# Patient Record
Sex: MALE | Race: WHITE | NOT HISPANIC OR LATINO | Employment: OTHER | URBAN - NONMETROPOLITAN AREA
[De-identification: names, ages, dates, MRNs, and addresses within clinical notes are randomized per-mention and may not be internally consistent; named-entity substitution may affect disease eponyms.]

---

## 2023-11-02 DIAGNOSIS — J44.9 CHRONIC OBSTRUCTIVE PULMONARY DISEASE, UNSPECIFIED COPD TYPE (MULTI): Primary | ICD-10-CM

## 2023-11-02 RX ORDER — TIOTROPIUM BROMIDE AND OLODATEROL 3.124; 2.736 UG/1; UG/1
2 SPRAY, METERED RESPIRATORY (INHALATION) EVERY 24 HOURS
Qty: 18 G | Refills: 0 | Status: SHIPPED | OUTPATIENT
Start: 2023-11-02 | End: 2024-03-28 | Stop reason: WASHOUT

## 2023-11-02 RX ORDER — ALBUTEROL SULFATE 90 UG/1
2 AEROSOL, METERED RESPIRATORY (INHALATION) EVERY 4 HOURS PRN
Qty: 18 G | Refills: 0 | Status: SHIPPED | OUTPATIENT
Start: 2023-11-02 | End: 2024-02-28 | Stop reason: SDUPTHER

## 2023-11-02 RX ORDER — ALBUTEROL SULFATE 90 UG/1
2 AEROSOL, METERED RESPIRATORY (INHALATION) EVERY 4 HOURS PRN
COMMUNITY
Start: 2022-12-03 | End: 2023-11-02 | Stop reason: SDUPTHER

## 2023-11-02 RX ORDER — TIOTROPIUM BROMIDE AND OLODATEROL 3.124; 2.736 UG/1; UG/1
2 SPRAY, METERED RESPIRATORY (INHALATION) EVERY 24 HOURS
COMMUNITY
Start: 2022-12-15 | End: 2023-11-02 | Stop reason: SDUPTHER

## 2023-11-02 NOTE — TELEPHONE ENCOUNTER
Patient provider out on leave, currently covering. Reviewed chart note in 9/2023. No contraindications noted for refilling medication. Refill approved.    Thanks,  Genevieve Souza, CNP

## 2023-11-27 ENCOUNTER — TELEPHONE (OUTPATIENT)
Dept: PRIMARY CARE | Facility: CLINIC | Age: 78
End: 2023-11-27
Payer: MEDICARE

## 2023-11-27 DIAGNOSIS — J43.9 PULMONARY EMPHYSEMA, UNSPECIFIED EMPHYSEMA TYPE (MULTI): Primary | ICD-10-CM

## 2023-12-11 ENCOUNTER — OFFICE VISIT (OUTPATIENT)
Dept: PRIMARY CARE | Facility: CLINIC | Age: 78
End: 2023-12-11
Payer: MEDICARE

## 2023-12-11 ENCOUNTER — LAB (OUTPATIENT)
Dept: LAB | Facility: LAB | Age: 78
End: 2023-12-11
Payer: MEDICARE

## 2023-12-11 VITALS
BODY MASS INDEX: 36.71 KG/M2 | SYSTOLIC BLOOD PRESSURE: 126 MMHG | WEIGHT: 262.2 LBS | HEART RATE: 75 BPM | HEIGHT: 71 IN | OXYGEN SATURATION: 97 % | DIASTOLIC BLOOD PRESSURE: 70 MMHG | TEMPERATURE: 98 F

## 2023-12-11 DIAGNOSIS — R20.0 NUMBNESS OF FINGERS: ICD-10-CM

## 2023-12-11 DIAGNOSIS — R20.0 NUMBNESS OF FINGERS: Primary | ICD-10-CM

## 2023-12-11 PROBLEM — I10 HIGH BLOOD PRESSURE: Status: ACTIVE | Noted: 2023-12-11

## 2023-12-11 PROBLEM — G25.0 ESSENTIAL TREMOR: Status: ACTIVE | Noted: 2023-12-11

## 2023-12-11 PROBLEM — K21.9 GASTROESOPHAGEAL REFLUX DISEASE: Status: ACTIVE | Noted: 2023-12-11

## 2023-12-11 PROBLEM — H26.9 CATARACT: Status: ACTIVE | Noted: 2023-12-11

## 2023-12-11 PROBLEM — R93.89 ABNORMAL CT OF THE CHEST: Status: ACTIVE | Noted: 2023-12-11

## 2023-12-11 PROBLEM — K59.00 CONSTIPATION: Status: ACTIVE | Noted: 2023-12-11

## 2023-12-11 PROBLEM — M79.9 DISORDER OF SOFT TISSUE: Status: ACTIVE | Noted: 2023-12-11

## 2023-12-11 PROBLEM — N40.0 BENIGN PROSTATIC HYPERPLASIA WITHOUT URINARY OBSTRUCTION: Status: ACTIVE | Noted: 2023-12-11

## 2023-12-11 PROBLEM — D72.829 LEUKOCYTOSIS: Status: ACTIVE | Noted: 2023-12-11

## 2023-12-11 PROBLEM — E03.9 HYPOTHYROIDISM: Status: ACTIVE | Noted: 2023-12-11

## 2023-12-11 PROBLEM — K30 INDIGESTION: Status: ACTIVE | Noted: 2023-12-11

## 2023-12-11 PROBLEM — E78.5 HYPERLIPIDEMIA: Status: ACTIVE | Noted: 2023-12-11

## 2023-12-11 PROBLEM — E11.9 TYPE 2 DIABETES MELLITUS WITHOUT COMPLICATION (MULTI): Status: ACTIVE | Noted: 2023-12-11

## 2023-12-11 PROBLEM — I25.10 CORONARY ARTERY DISEASE: Status: ACTIVE | Noted: 2023-12-11

## 2023-12-11 PROBLEM — J18.9 COMMUNITY ACQUIRED PNEUMONIA: Status: ACTIVE | Noted: 2023-12-11

## 2023-12-11 PROBLEM — J44.9 COPD (CHRONIC OBSTRUCTIVE PULMONARY DISEASE) (MULTI): Status: ACTIVE | Noted: 2023-12-11

## 2023-12-11 LAB
ALBUMIN SERPL BCP-MCNC: 3.9 G/DL (ref 3.4–5)
ALP SERPL-CCNC: 59 U/L (ref 33–136)
ALT SERPL W P-5'-P-CCNC: 13 U/L (ref 10–52)
ANION GAP SERPL CALC-SCNC: 13 MMOL/L (ref 10–20)
AST SERPL W P-5'-P-CCNC: 15 U/L (ref 9–39)
BASOPHILS # BLD AUTO: 0.08 X10*3/UL (ref 0–0.1)
BASOPHILS NFR BLD AUTO: 0.8 %
BILIRUB SERPL-MCNC: 0.3 MG/DL (ref 0–1.2)
BUN SERPL-MCNC: 15 MG/DL (ref 6–23)
CALCIUM SERPL-MCNC: 8.6 MG/DL (ref 8.6–10.3)
CHLORIDE SERPL-SCNC: 106 MMOL/L (ref 98–107)
CO2 SERPL-SCNC: 25 MMOL/L (ref 21–32)
CREAT SERPL-MCNC: 0.74 MG/DL (ref 0.5–1.3)
EOSINOPHIL # BLD AUTO: 0.37 X10*3/UL (ref 0–0.4)
EOSINOPHIL NFR BLD AUTO: 3.5 %
ERYTHROCYTE [DISTWIDTH] IN BLOOD BY AUTOMATED COUNT: 12.9 % (ref 11.5–14.5)
GFR SERPL CREATININE-BSD FRML MDRD: >90 ML/MIN/1.73M*2
GLUCOSE SERPL-MCNC: 143 MG/DL (ref 74–99)
HCT VFR BLD AUTO: 43.2 % (ref 41–52)
HGB BLD-MCNC: 14.1 G/DL (ref 13.5–17.5)
IMM GRANULOCYTES # BLD AUTO: 0.04 X10*3/UL (ref 0–0.5)
IMM GRANULOCYTES NFR BLD AUTO: 0.4 % (ref 0–0.9)
LYMPHOCYTES # BLD AUTO: 2.87 X10*3/UL (ref 0.8–3)
LYMPHOCYTES NFR BLD AUTO: 27 %
MCH RBC QN AUTO: 30.9 PG (ref 26–34)
MCHC RBC AUTO-ENTMCNC: 32.6 G/DL (ref 32–36)
MCV RBC AUTO: 95 FL (ref 80–100)
MONOCYTES # BLD AUTO: 0.68 X10*3/UL (ref 0.05–0.8)
MONOCYTES NFR BLD AUTO: 6.4 %
NEUTROPHILS # BLD AUTO: 6.59 X10*3/UL (ref 1.6–5.5)
NEUTROPHILS NFR BLD AUTO: 61.9 %
NRBC BLD-RTO: 0 /100 WBCS (ref 0–0)
PLATELET # BLD AUTO: 344 X10*3/UL (ref 150–450)
POTASSIUM SERPL-SCNC: 4.4 MMOL/L (ref 3.5–5.3)
PROT SERPL-MCNC: 6.6 G/DL (ref 6.4–8.2)
RBC # BLD AUTO: 4.56 X10*6/UL (ref 4.5–5.9)
SODIUM SERPL-SCNC: 140 MMOL/L (ref 136–145)
TSH SERPL-ACNC: 1.64 MIU/L (ref 0.44–3.98)
WBC # BLD AUTO: 10.6 X10*3/UL (ref 4.4–11.3)

## 2023-12-11 PROCEDURE — 36415 COLL VENOUS BLD VENIPUNCTURE: CPT

## 2023-12-11 PROCEDURE — 1159F MED LIST DOCD IN RCRD: CPT | Performed by: FAMILY MEDICINE

## 2023-12-11 PROCEDURE — 99213 OFFICE O/P EST LOW 20 MIN: CPT | Performed by: FAMILY MEDICINE

## 2023-12-11 PROCEDURE — 82607 VITAMIN B-12: CPT

## 2023-12-11 PROCEDURE — 3074F SYST BP LT 130 MM HG: CPT | Performed by: FAMILY MEDICINE

## 2023-12-11 PROCEDURE — 1125F AMNT PAIN NOTED PAIN PRSNT: CPT | Performed by: FAMILY MEDICINE

## 2023-12-11 PROCEDURE — 3078F DIAST BP <80 MM HG: CPT | Performed by: FAMILY MEDICINE

## 2023-12-11 PROCEDURE — 1036F TOBACCO NON-USER: CPT | Performed by: FAMILY MEDICINE

## 2023-12-11 RX ORDER — ROSUVASTATIN CALCIUM 10 MG/1
TABLET, COATED ORAL EVERY 24 HOURS
COMMUNITY
End: 2024-02-02 | Stop reason: SDUPTHER

## 2023-12-11 RX ORDER — LISINOPRIL 10 MG/1
1 TABLET ORAL DAILY
COMMUNITY
End: 2024-02-02 | Stop reason: SDUPTHER

## 2023-12-11 RX ORDER — CLOPIDOGREL BISULFATE 75 MG/1
1 TABLET ORAL DAILY
COMMUNITY
End: 2024-02-02 | Stop reason: SDUPTHER

## 2023-12-11 RX ORDER — LEVOTHYROXINE SODIUM 75 UG/1
1 TABLET ORAL DAILY
COMMUNITY
Start: 2015-07-30 | End: 2024-02-02 | Stop reason: SDUPTHER

## 2023-12-11 RX ORDER — PHENYLEPHRINE HCL 10 MG
TABLET ORAL
COMMUNITY

## 2023-12-11 RX ORDER — TAMSULOSIN HYDROCHLORIDE 0.4 MG/1
1 CAPSULE ORAL EVERY 24 HOURS
COMMUNITY
Start: 2023-06-29 | End: 2024-03-05 | Stop reason: SDUPTHER

## 2023-12-11 ASSESSMENT — ENCOUNTER SYMPTOMS
DEPRESSION: 0
LOSS OF SENSATION IN FEET: 1
OCCASIONAL FEELINGS OF UNSTEADINESS: 1

## 2023-12-11 ASSESSMENT — PATIENT HEALTH QUESTIONNAIRE - PHQ9
1. LITTLE INTEREST OR PLEASURE IN DOING THINGS: NOT AT ALL
2. FEELING DOWN, DEPRESSED OR HOPELESS: NOT AT ALL
SUM OF ALL RESPONSES TO PHQ9 QUESTIONS 1 AND 2: 0

## 2023-12-11 ASSESSMENT — LIFESTYLE VARIABLES
HOW OFTEN DO YOU HAVE A DRINK CONTAINING ALCOHOL: NEVER
SKIP TO QUESTIONS 9-10: 1
HOW OFTEN DO YOU HAVE SIX OR MORE DRINKS ON ONE OCCASION: NEVER
AUDIT-C TOTAL SCORE: 0
HOW MANY STANDARD DRINKS CONTAINING ALCOHOL DO YOU HAVE ON A TYPICAL DAY: PATIENT DOES NOT DRINK

## 2023-12-11 ASSESSMENT — PAIN SCALES - GENERAL: PAINLEVEL: 5

## 2023-12-11 NOTE — PROGRESS NOTES
"Subjective   Patient ID: Fortino Hastings is a 78 y.o. male who presents for Numbness (And  tingling in right hand and bilat toes gradually getting worse).    HPI   He presents today with complaints of numbness in his fingers.  This most all the time his right hand and often on his left as well.  If he is playing on the iPad, he will noticed that the start to get more numb.  He does think the little finger is involved.    He also has some numbness or at least coldness in toes as well at times.  It is an intermittent problem but very frequent.  He states is been gradually getting worse over many many months if not years.    Review of Systems    Objective   /70   Pulse 75   Temp 36.7 °C (98 °F)   Ht 1.803 m (5' 11\")   Wt 119 kg (262 lb 3.2 oz)   SpO2 97%   BMI 36.57 kg/m²     Physical Exam  He is alert, parishes, his affect is pleasant.  Respirations are easy.  He has a negative Tinel's test at the wrist and at the elbows.  Fingers are warm and cap refill is good.    Assessment/Plan   Diagnoses and all orders for this visit:  Numbness of fingers  -     Comprehensive Metabolic Panel; Future  -     TSH with reflex to Free T4 if abnormal; Future  -     CBC and Auto Differential; Future  -     EMG & nerve conduction; Future  -     Vitamin B12; Future  Will start with EMG and the blood tests.  I will see him back when those are complete.  In the meantime, he will use night splint at least his right hand to see if that is helpful.       "

## 2023-12-11 NOTE — PATIENT INSTRUCTIONS
Let's get some blood tests today to try to find a cause.   Schedule the EMG test with Dr. Garza. Follow up after that. IN the meantime, wear a splint at night to keep wrist straight

## 2023-12-12 LAB — VIT B12 SERPL-MCNC: 514 PG/ML (ref 211–911)

## 2023-12-21 ENCOUNTER — ANCILLARY PROCEDURE (OUTPATIENT)
Dept: NEUROLOGY | Facility: CLINIC | Age: 78
End: 2023-12-21
Payer: MEDICARE

## 2023-12-21 DIAGNOSIS — R20.0 NUMBNESS: ICD-10-CM

## 2023-12-21 DIAGNOSIS — R20.0 NUMBNESS OF FINGERS: ICD-10-CM

## 2023-12-21 DIAGNOSIS — M79.601 PAIN OF RIGHT UPPER EXTREMITY: ICD-10-CM

## 2023-12-21 DIAGNOSIS — M79.602 PAIN OF LEFT UPPER EXTREMITY: Primary | ICD-10-CM

## 2023-12-21 PROCEDURE — 95912 NRV CNDJ TEST 11-12 STUDIES: CPT | Performed by: PSYCHIATRY & NEUROLOGY

## 2023-12-21 PROCEDURE — 95886 MUSC TEST DONE W/N TEST COMP: CPT | Performed by: PSYCHIATRY & NEUROLOGY

## 2023-12-21 NOTE — PROGRESS NOTES
Nerve conduction studies and needle EMG was performed today on this patient.  Full scanned report is available in the Media tab in Epic (Chart Review, Media tab, double-click the report to enlarge it).    Impression:  Nerve conduction study and needle examination of the bilateral upper extremities were performed, see details in table. The results were abnormal because of the followin. Absent median sensory responses on the right. Prolonged left median sensory latencies. Bilaterally prolonged median motor responses. Bilaterally low median motor amplitudes with mild denervation changes in bilateral APB muscles. The results are consistent with bilateral median neuropathy at the wrist (carpal tunnel syndrome). On the right side, moderate, and on the left side, mild to moderate.      Jacinto Garza MD

## 2023-12-26 PROBLEM — R04.2 HEMOPTYSIS: Status: ACTIVE | Noted: 2022-11-21

## 2023-12-27 ENCOUNTER — OFFICE VISIT (OUTPATIENT)
Dept: PRIMARY CARE | Facility: CLINIC | Age: 78
End: 2023-12-27
Payer: MEDICARE

## 2023-12-27 VITALS
HEART RATE: 62 BPM | WEIGHT: 261.2 LBS | BODY MASS INDEX: 36.43 KG/M2 | OXYGEN SATURATION: 97 % | SYSTOLIC BLOOD PRESSURE: 142 MMHG | DIASTOLIC BLOOD PRESSURE: 62 MMHG

## 2023-12-27 DIAGNOSIS — R20.0 NUMBNESS OF TOES: ICD-10-CM

## 2023-12-27 DIAGNOSIS — G56.03 BILATERAL CARPAL TUNNEL SYNDROME: Primary | ICD-10-CM

## 2023-12-27 PROCEDURE — 99214 OFFICE O/P EST MOD 30 MIN: CPT | Performed by: FAMILY MEDICINE

## 2023-12-27 PROCEDURE — 3077F SYST BP >= 140 MM HG: CPT | Performed by: FAMILY MEDICINE

## 2023-12-27 PROCEDURE — 3078F DIAST BP <80 MM HG: CPT | Performed by: FAMILY MEDICINE

## 2023-12-27 PROCEDURE — 1036F TOBACCO NON-USER: CPT | Performed by: FAMILY MEDICINE

## 2023-12-27 PROCEDURE — 1159F MED LIST DOCD IN RCRD: CPT | Performed by: FAMILY MEDICINE

## 2023-12-27 PROCEDURE — 1126F AMNT PAIN NOTED NONE PRSNT: CPT | Performed by: FAMILY MEDICINE

## 2023-12-27 ASSESSMENT — PAIN SCALES - GENERAL: PAINLEVEL: 0-NO PAIN

## 2023-12-27 ASSESSMENT — PATIENT HEALTH QUESTIONNAIRE - PHQ9
2. FEELING DOWN, DEPRESSED OR HOPELESS: NOT AT ALL
SUM OF ALL RESPONSES TO PHQ9 QUESTIONS 1 AND 2: 0
1. LITTLE INTEREST OR PLEASURE IN DOING THINGS: NOT AT ALL

## 2023-12-27 ASSESSMENT — ENCOUNTER SYMPTOMS
DEPRESSION: 0
LOSS OF SENSATION IN FEET: 0
OCCASIONAL FEELINGS OF UNSTEADINESS: 0

## 2023-12-27 NOTE — PATIENT INSTRUCTIONS
Visit with Dr. Green to discuss treatment for the carpal tunnel.     Discuss the toes with Dr. Castañeda. We can see about an MRI. Call [place] to schedule this.   WE can consider acupuncture as well.

## 2023-12-27 NOTE — PROGRESS NOTES
Subjective   Patient ID: Fortino Hastings is a 78 y.o. male who presents for f/u Dr. Garza.    HPI   He presents today for follow-up on his carpal tunnel syndrome and also with some numbness that has in his feet.    I reviewed his EMG test with him which showed that he does have bilateral carpal tunnel syndrome.  He did speak with Dr. Garza about this who felt that surgery might be the best option for him.  He does plan to organize sure she is getting harder hard to do because of this.  He is wearing the night splints on his wrist but that is not doing much of a good job.  Continues to be his ring finger through his thumb though occasionally will be his little finger as well.    He also has numbness in the third through fifth toes regularly.  It is worse if he has been walking around a lot or if he is tired in the evening.  Sometimes to keep up at night.  If he bangs his foot on the side of the bed at night sometimes that will help with the numbness.  States his right foot does tend to splay out more these days.  Does take a regular exercise class.  He had discussed this with Dr. Garza felt he might need an MRI.    Review of Systems    Objective   /62   Pulse 62   Wt 118 kg (261 lb 3.2 oz)   SpO2 97%   BMI 36.43 kg/m²     Physical Exam  He is alert, parishes, his affect is pleasant.  Respirations are easy.  Positive Tinel's test at the wrist of the right.  He does walk with his right foot externally rotated.  Assessment/Plan   Diagnoses and all orders for this visit:  Bilateral carpal tunnel syndrome  Numbness of toes  -     MR lumbar spine wo IV contrast; Future  Will have him see Dr. Yang to discuss his carpal tunnel as he may need surgery for that.  I have ordered an MRI for the numbness in the toes to see if there is something in the back that might be contributing to this.  I did tell him this may not be covered and he will let me know if it is not.  May consider acupuncture for this as  well, especially since he bangs the foot and help with the pain.

## 2024-01-05 ENCOUNTER — TELEPHONE (OUTPATIENT)
Dept: PRIMARY CARE | Facility: CLINIC | Age: 79
End: 2024-01-05
Payer: MEDICARE

## 2024-01-05 ENCOUNTER — APPOINTMENT (OUTPATIENT)
Dept: RADIOLOGY | Facility: HOSPITAL | Age: 79
End: 2024-01-05
Payer: MEDICARE

## 2024-01-05 NOTE — TELEPHONE ENCOUNTER
I spoke with him today because I did discuss this with Dr. Ubaldo Avendano, the peer to peer reviewer, last night.  The MRI is not covered because he has not completed 6 weeks of conservative therapy.      Bill states that he has been doing chiropractic care and he has been doing home stretching.  He has been using some over-the-counter medications to help with the pain.  I asked him to schedule appointment again in 2 weeks so we can see how he has been responding to the above.  If he is not relieving his pain with this, we can reschedule the MRI, which Dr. Avendano that should be covered as long as he is done the 6 weeks of conservative therapy.  He will just need a full neuroexam.

## 2024-01-22 ENCOUNTER — OFFICE VISIT (OUTPATIENT)
Dept: PRIMARY CARE | Facility: CLINIC | Age: 79
End: 2024-01-22
Payer: MEDICARE

## 2024-01-22 VITALS
HEART RATE: 66 BPM | DIASTOLIC BLOOD PRESSURE: 62 MMHG | WEIGHT: 262.4 LBS | SYSTOLIC BLOOD PRESSURE: 134 MMHG | OXYGEN SATURATION: 96 % | BODY MASS INDEX: 36.6 KG/M2

## 2024-01-22 DIAGNOSIS — H92.01 RIGHT EAR PAIN: ICD-10-CM

## 2024-01-22 DIAGNOSIS — R20.0 NUMBNESS OF TOES: ICD-10-CM

## 2024-01-22 DIAGNOSIS — G89.29 CHRONIC RIGHT-SIDED LOW BACK PAIN WITH RIGHT-SIDED SCIATICA: Primary | ICD-10-CM

## 2024-01-22 DIAGNOSIS — E11.9 TYPE 2 DIABETES MELLITUS WITHOUT COMPLICATION, WITHOUT LONG-TERM CURRENT USE OF INSULIN (MULTI): ICD-10-CM

## 2024-01-22 DIAGNOSIS — M54.41 CHRONIC RIGHT-SIDED LOW BACK PAIN WITH RIGHT-SIDED SCIATICA: Primary | ICD-10-CM

## 2024-01-22 DIAGNOSIS — R29.898 WEAKNESS OF RIGHT LEG: ICD-10-CM

## 2024-01-22 LAB — POC HEMOGLOBIN A1C: 6.3 % (ref 4.2–6.5)

## 2024-01-22 PROCEDURE — 1159F MED LIST DOCD IN RCRD: CPT | Performed by: FAMILY MEDICINE

## 2024-01-22 PROCEDURE — 1036F TOBACCO NON-USER: CPT | Performed by: FAMILY MEDICINE

## 2024-01-22 PROCEDURE — 3075F SYST BP GE 130 - 139MM HG: CPT | Performed by: FAMILY MEDICINE

## 2024-01-22 PROCEDURE — 3078F DIAST BP <80 MM HG: CPT | Performed by: FAMILY MEDICINE

## 2024-01-22 PROCEDURE — 1126F AMNT PAIN NOTED NONE PRSNT: CPT | Performed by: FAMILY MEDICINE

## 2024-01-22 PROCEDURE — 99214 OFFICE O/P EST MOD 30 MIN: CPT | Performed by: FAMILY MEDICINE

## 2024-01-22 ASSESSMENT — ENCOUNTER SYMPTOMS
DEPRESSION: 0
OCCASIONAL FEELINGS OF UNSTEADINESS: 0
LOSS OF SENSATION IN FEET: 0

## 2024-01-22 ASSESSMENT — PAIN SCALES - GENERAL: PAINLEVEL: 0-NO PAIN

## 2024-01-22 NOTE — PROGRESS NOTES
"Subjective   Patient ID: Fortino Hastings is a 78 y.o. male who presents for MRI Options and Earache.    He presents today for follow-up on his back pain.  Suffering for work 2 years with the back pain. Pain mostly in the right back and hip and goes down the leg.     He has tried a number of modalities for this pain.  He normally sees a chiropractor every 3 to 4 weeks but over the last 6 to 8 weeks has increased that to every other week.  He has attended physical therapy for this problem 3 times in the last 2 years.  He did not see much improvement with that.  He has been given exercises to do, which she does every day.  Over the last 6 weeks, however, he stepped that up to doing the stretches and exercises twice a day.  He has not noticed much response with that.  He is also attending the senior exercise class twice a week in order to help the pain.  Does take ibuprofen as needed which helps only somewhat.    He uses cane if he walks on level ground because he can feel unsteady.  He walks a long distance the right hip will freeze up and just will not move for him.  He will have to sit down and rest.  If he drives in the car for longer than about 40 minutes he states his feet and legs will also freeze up in a way that he states he cannot quite describe.  This reason, Michelle, his wife will have to drive home.  For this reason, he does not really have over long distances any longer.  That is, this is really impacting his lifestyle.    He wears compression hose, which can help especially with walking for long distances.  He uses a cane on unlevel ground or if he has to walk a long time. If he walks a long distance, the right hip \"freezes\" and won't move.    He has numbness in his lateral toes bilaterally but more on the right than the left.    Also checked his hemoglobin A1c today which is well-controlled.    Earache   There is pain in the right ear. This is a new problem. The current episode started 1 to 4 weeks ago. " Episode frequency: occassionally. The problem has been unchanged. There has been no fever. The pain is at a severity of 5/10. The pain is moderate.        Review of Systems   HENT:  Positive for ear pain (Pain in the right ear, intermittently.  Can be severe, like someone's dabbing it with a knitting needle.  Lasts only a brief time but if longer, Tylenol will relieve it).        Objective   /62   Pulse 66   Wt 119 kg (262 lb 6.4 oz)   SpO2 96%   BMI 36.60 kg/m²     Physical Exam  Constitutional:       Appearance: Normal appearance. He is not ill-appearing.   HENT:      Right Ear: Tympanic membrane and ear canal normal. No middle ear effusion. There is no impacted cerumen. No foreign body. Tympanic membrane is not perforated, erythematous, retracted or bulging.      Nose: No mucosal edema, congestion or rhinorrhea.   Pulmonary:      Effort: Pulmonary effort is normal.   Musculoskeletal:      Cervical back: Tenderness: ..      Lumbar back: Spasms and tenderness (Over the lumbar sacral joint and in the paraspinal muscles of the lower back as well as palpating the lateral posterior lateral hip) present. No swelling or edema. Positive right straight leg raise test (Mildly so).        Back:    Neurological:      Mental Status: He is alert.      Gait: Gait abnormal (Walks somewhat slowly and the feet are held tomorrow a 45 degree outward splay.).      Deep Tendon Reflexes:      Reflex Scores:       Patellar reflexes are 2+ on the right side and 2+ on the left side.       Achilles reflexes are 2+ on the right side and 2+ on the left side.     Comments: Strength is grossly 5 out of-although large muscle groups of the lower extremity.  He does have a subjective sense of numbness over the lateral 2-3 toes on each side on the upper surface especially.         Assessment/Plan   Diagnoses and all orders for this visit:  Chronic right-sided low back pain with right-sided sciatica  -     MR lumbar spine wo IV contrast;  Future  Numbness of toes  -     MR lumbar spine wo IV contrast; Future  Weakness of right leg  -     MR lumbar spine wo IV contrast; Future  Type 2 diabetes mellitus without complication, without long-term current use of insulin (CMS/[facility])  -     POCT glycosylated hemoglobin (Hb A1C) manually resulted  He has completed 3 rounds of physical therapy and over the last 6 weeks has increased his stretching, increase his chiropractic visits and been doing the senior exercise all in an effort to improve this but he has not seen much relief.  Ibuprofen has been much relief.  I do recommend that we do the since the 6-week course of treatment has not helped.    I want him to try to pop his ears frequently in the day and see if that helps open up the right ear pain which is likely related to symptoms eustachian tube dysfunction.    Diabetes is well-controlled.

## 2024-01-22 NOTE — PATIENT INSTRUCTIONS
Go ahead and schedule the MRI again.     Pop ears every and let me know if that does not resolve the issue

## 2024-02-02 DIAGNOSIS — I10 PRIMARY HYPERTENSION: ICD-10-CM

## 2024-02-02 DIAGNOSIS — I25.10 CORONARY ARTERY DISEASE, UNSPECIFIED VESSEL OR LESION TYPE, UNSPECIFIED WHETHER ANGINA PRESENT, UNSPECIFIED WHETHER NATIVE OR TRANSPLANTED HEART: Primary | ICD-10-CM

## 2024-02-02 DIAGNOSIS — E03.9 ACQUIRED HYPOTHYROIDISM: ICD-10-CM

## 2024-02-02 RX ORDER — ROSUVASTATIN CALCIUM 10 MG/1
10 TABLET, COATED ORAL EVERY 24 HOURS
Qty: 90 TABLET | Refills: 1 | Status: SHIPPED | OUTPATIENT
Start: 2024-02-02

## 2024-02-02 RX ORDER — METOPROLOL TARTRATE 25 MG/1
25 TABLET, FILM COATED ORAL 2 TIMES DAILY
Qty: 180 TABLET | Refills: 1 | Status: SHIPPED | OUTPATIENT
Start: 2024-02-02

## 2024-02-02 RX ORDER — LISINOPRIL 10 MG/1
10 TABLET ORAL DAILY
Qty: 90 TABLET | Refills: 1 | Status: SHIPPED | OUTPATIENT
Start: 2024-02-02

## 2024-02-02 RX ORDER — LEVOTHYROXINE SODIUM 75 UG/1
75 TABLET ORAL DAILY
Qty: 90 TABLET | Refills: 1 | Status: SHIPPED | OUTPATIENT
Start: 2024-02-02

## 2024-02-02 RX ORDER — CLOPIDOGREL BISULFATE 75 MG/1
75 TABLET ORAL DAILY
Qty: 90 TABLET | Refills: 1 | Status: SHIPPED | OUTPATIENT
Start: 2024-02-02

## 2024-02-05 ENCOUNTER — TELEPHONE (OUTPATIENT)
Dept: PULMONOLOGY | Facility: CLINIC | Age: 79
End: 2024-02-05
Payer: MEDICARE

## 2024-02-05 NOTE — TELEPHONE ENCOUNTER
Pt called just got notice that his stiolto will no longer be cover, asking if it can be change,   He believes the anoro ellipta is covered by his insurance  Would like it sent to mail order   [facility] mail order

## 2024-02-07 DIAGNOSIS — J43.2 CENTRILOBULAR EMPHYSEMA (MULTI): Primary | ICD-10-CM

## 2024-02-19 ENCOUNTER — TELEPHONE (OUTPATIENT)
Dept: PRIMARY CARE | Facility: CLINIC | Age: 79
End: 2024-02-19
Payer: MEDICARE

## 2024-02-19 NOTE — TELEPHONE ENCOUNTER
MRI is still not approved.  Patient is frustrated and is asking what needs to be done.  Pt states he has had chiropractic care continuously up until 2/6 when he stopped due to carpal tunnel surgery.  He is also asking if you think acupuncture would help.

## 2024-02-21 ENCOUNTER — APPOINTMENT (OUTPATIENT)
Dept: RADIOLOGY | Facility: HOSPITAL | Age: 79
End: 2024-02-21
Payer: MEDICARE

## 2024-02-28 DIAGNOSIS — J44.9 CHRONIC OBSTRUCTIVE PULMONARY DISEASE, UNSPECIFIED COPD TYPE (MULTI): ICD-10-CM

## 2024-02-28 RX ORDER — ALBUTEROL SULFATE 90 UG/1
2 AEROSOL, METERED RESPIRATORY (INHALATION) EVERY 4 HOURS PRN
Qty: 18 G | Refills: 0 | Status: SHIPPED | OUTPATIENT
Start: 2024-02-28 | End: 2024-03-28 | Stop reason: SDUPTHER

## 2024-03-05 DIAGNOSIS — N40.0 BENIGN PROSTATIC HYPERPLASIA WITHOUT URINARY OBSTRUCTION: Primary | ICD-10-CM

## 2024-03-05 RX ORDER — TAMSULOSIN HYDROCHLORIDE 0.4 MG/1
0.4 CAPSULE ORAL EVERY 24 HOURS
Qty: 90 CAPSULE | Refills: 1 | Status: SHIPPED | OUTPATIENT
Start: 2024-03-05

## 2024-03-28 ENCOUNTER — OFFICE VISIT (OUTPATIENT)
Dept: PULMONOLOGY | Facility: CLINIC | Age: 79
End: 2024-03-28
Payer: MEDICARE

## 2024-03-28 ENCOUNTER — TELEPHONE (OUTPATIENT)
Dept: PULMONOLOGY | Facility: CLINIC | Age: 79
End: 2024-03-28

## 2024-03-28 VITALS
OXYGEN SATURATION: 95 % | SYSTOLIC BLOOD PRESSURE: 122 MMHG | BODY MASS INDEX: 36.18 KG/M2 | DIASTOLIC BLOOD PRESSURE: 72 MMHG | WEIGHT: 259.4 LBS | HEART RATE: 63 BPM

## 2024-03-28 DIAGNOSIS — R93.89 ABNORMAL CT OF THE CHEST: ICD-10-CM

## 2024-03-28 DIAGNOSIS — J43.2 CENTRILOBULAR EMPHYSEMA (MULTI): Primary | ICD-10-CM

## 2024-03-28 DIAGNOSIS — J44.9 CHRONIC OBSTRUCTIVE PULMONARY DISEASE, UNSPECIFIED COPD TYPE (MULTI): ICD-10-CM

## 2024-03-28 PROCEDURE — 3074F SYST BP LT 130 MM HG: CPT | Performed by: NURSE PRACTITIONER

## 2024-03-28 PROCEDURE — 99214 OFFICE O/P EST MOD 30 MIN: CPT | Performed by: NURSE PRACTITIONER

## 2024-03-28 PROCEDURE — 1159F MED LIST DOCD IN RCRD: CPT | Performed by: NURSE PRACTITIONER

## 2024-03-28 PROCEDURE — 1036F TOBACCO NON-USER: CPT | Performed by: NURSE PRACTITIONER

## 2024-03-28 PROCEDURE — 3078F DIAST BP <80 MM HG: CPT | Performed by: NURSE PRACTITIONER

## 2024-03-28 PROCEDURE — 1160F RVW MEDS BY RX/DR IN RCRD: CPT | Performed by: NURSE PRACTITIONER

## 2024-03-28 RX ORDER — ALBUTEROL SULFATE 90 UG/1
2 AEROSOL, METERED RESPIRATORY (INHALATION) EVERY 4 HOURS PRN
Qty: 24 G | Refills: 3 | Status: SHIPPED | OUTPATIENT
Start: 2024-03-28

## 2024-03-28 NOTE — TELEPHONE ENCOUNTER
Pt lmm and said that he would like the respvi?? Sent to [facility] he said it is cheaper to get med there    Thank you!

## 2024-03-28 NOTE — PROGRESS NOTES
Subjective   Patient ID: Fortino Hastings is a 78 y.o. male who presents for No chief complaint on file..  HPI  HPI: New patient here today for follow-up after hospitalization. He was hospitalized with pneumonia. On a chest CT he had quite a bit of middle lobe collapse. He also has emphysema. Patient does have albuterol at home he does get short of breath. I would like him to have a follow-up CT in 3 months at that time if the middle lobe area is still present then he may need a PET scan and EBUS.    03/16/23 he is here today for follow-up. He had a new chest CT which shows considerable improvement in the right middle lobe collapse. It is almost completely resolved but I would like to do another CT in 6 months. He had a PFT which shows an FEV1 of 74%. He was started on Stiolto which he really does like and he uses his albuterol in the evenings before bed which controls his wheezing. He had a lot of questions today with his wife I was able to answer these. 10 minutes spent preparing patient's chart. 30 minutes spent with patient answering questions and determining care. 10 minutes spent charting and ordering tests and medications    09/21/23 he is here today for follow-up. He had a new chest CT which does show improvement of the right middle lobe. He does have some very small nodules 4 and 5 mm. He is doing well with the Stiolto he uses his albuterol once a day. He tries to remain active. 5 minutes spent preparing patient's chart. 20 minutes spent with patient answering questions and determining care. 5 minutes spent charting and ordering tests and medications     03/28/24 he is here today for follow-up.  Unfortunately insurance would no longer cover his Stiolto so we had to change him to Anoro.  It is expensive for him.  It looks like insurance will cover Bevespi also I encouraged him to call insurance and see what his cost would be for the Bevespi.  Overall he has been doing well.  He just had carpal tunnel surgery  and is healing nicely.         Previous pulmonary history:   He has no history of recurrent infections, or lung disease as a child. He had no previous lung hx, never on oxygen or inhaler therapy. He currently is on no supplemental oxygen.      Inhalers/nebulized medications: albuterol      Hospitalization History:  He has been hospitalized over the last year for breathing related problem.     Sleep history:  Denies snoring, apneas. Admits to feeling tired during the day.   Does take frequent naps.  STOP-BANG score of 4     Comorbidities:   HTN  Hypothyroidism    SH:  smoking: former   drinking: none  illicit drug use: none     Occupation: (Full questionnaire on exposures obtained, discussed with the patient and scanned to EMR)  worked as retired teacher   No known exposure to asbestos, silica or beryllium     Family History:  No family history of lung diseases or cancer     Imaging history: (I have personally reviewed the imaging below)  12/1/22 Ct for PE consolidation in the middle lobe marked patchy and nodular infiltration in the middle lobe as well as the medial upper lobe. Several areas of large bulla. Also some consolidation in the left lung base. Prominent paratracheal nodes and enlarged hilar nodes     PFTs: PENDING   // -> FEV1/FVC ratio/FEV1 (no BD response)/FVC/DLCO /TLC/RV to TLC ratio     6 MWTs: None on record     Lung biopsy: None on record     Echo: None on record  -> Normal EF, diastolic dysfunction, with normal LA, RV size and function     Labs:  : Hb 12.5 , Eos <250, Bicarb 24 , Creat 0.80       Review of Systems   All other systems reviewed and are negative.      Objective   Physical Exam  Vitals and nursing note reviewed.   Constitutional:       Appearance: Normal appearance. He is obese.   HENT:      Head: Normocephalic.      Nose: Nose normal.   Eyes:      Pupils: Pupils are equal, round, and reactive to light.   Cardiovascular:      Rate and Rhythm: Normal rate.   Pulmonary:      Effort:  Pulmonary effort is normal.      Breath sounds: Normal breath sounds.   Neurological:      General: No focal deficit present.      Mental Status: He is alert and oriented to person, place, and time. Mental status is at baseline.   Psychiatric:         Mood and Affect: Mood normal.         Behavior: Behavior normal.         Thought Content: Thought content normal.         Judgment: Judgment normal.         Assessment/Plan     # COPD with emphysema: - chest CT shows emphysema with bulla  -will screen for A1AT deficiency in clinic today (genetic screen)  -FEV1 post-bd of , GOLD stage  -At baseline with no active sign of exacerbation (increased dyspnea, cough, sputum or change in sputum characteristic)  -Advanced therapies including LVRS and Lung transplantation.  -Counseled on the role of diet and exercise  -Pulmonary rehab referral made.  -Vaccinations: Yearly influenza vaccines, Pneumoccocal (both PCV13 and PPSV23 for all patients 65 y.o or above)  -Depression score.  -Sleep evaluation as below.  -Echo to evaluate for core pulmonale.  -Does not need oxygen at rest. Oxygen need evaluation with walking and sleep (nighttime oximetry)   -He has albuterol I will start him on Stiolto or Anoro whichever his insurance will cover   - 03/16/23 he is using Stiolto and has noticed an improvement. He uses his albuterol every night before bed  09/21/23 he is doing very well with the Stiolto he uses that regularly uses his albuterol about once a day  03/28/24 insurance changed him to Anoro but it is very expensive.  Insurance will also cover Bevespi he is going to call and find out what his cost would be for Bevespi if it is less we can certainly change him    #Lung nodules   - With right middle lobe collapse  09/21/23 chest CT does show improvement of the right middle lobe also has some small nodules    #Post hospitalization for pneumonia   -Chest CT did show right middle lobe collapse along with emphysema and bullae   -We will  repeat a chest CT in 3 months   -Patient may need a PET scan and an EBUS if the area still persists   - 03/16/23 the right middle lobe does show improvement although not complete resolution. I would like a new CT in 6 months -shows a 4 mm nodule in the right lower lobe  03/28/24 his last chest CT did show considerable improvement.  We discussed doing a CT before his next visit but we are actually going to wait and talk about it at his next visit.     Mr. Hastings it was a pleasure seeing you in the office today. We discussed the following:       - Please continue your Anoro, you use this every day   - You have your albuterol to use when you feel short of breath   - Please stay active    Follow up in 6 months          JERICA Garcia-JOSELO 03/28/24 10:53 AM

## 2024-03-28 NOTE — PATIENT INSTRUCTIONS
Mr. Hastings it was a pleasure seeing you in the office today. We discussed the following:       - Please continue your Anoro, you use this every day   - You have your albuterol to use when you feel short of breath   - Please stay active    Follow up in 6 months

## 2024-04-16 ENCOUNTER — APPOINTMENT (OUTPATIENT)
Dept: PRIMARY CARE | Facility: CLINIC | Age: 79
End: 2024-04-16
Payer: MEDICARE

## 2024-04-17 ENCOUNTER — OFFICE VISIT (OUTPATIENT)
Dept: PRIMARY CARE | Facility: CLINIC | Age: 79
End: 2024-04-17
Payer: MEDICARE

## 2024-04-17 VITALS
WEIGHT: 262.2 LBS | HEART RATE: 63 BPM | BODY MASS INDEX: 36.57 KG/M2 | OXYGEN SATURATION: 96 % | DIASTOLIC BLOOD PRESSURE: 72 MMHG | SYSTOLIC BLOOD PRESSURE: 148 MMHG

## 2024-04-17 DIAGNOSIS — G89.29 CHRONIC RIGHT-SIDED LOW BACK PAIN WITH RIGHT-SIDED SCIATICA: Primary | ICD-10-CM

## 2024-04-17 DIAGNOSIS — M54.41 CHRONIC RIGHT-SIDED LOW BACK PAIN WITH RIGHT-SIDED SCIATICA: Primary | ICD-10-CM

## 2024-04-17 DIAGNOSIS — R29.898 WEAKNESS OF BOTH LEGS: ICD-10-CM

## 2024-04-17 DIAGNOSIS — R20.0 NUMBNESS OF TOES: ICD-10-CM

## 2024-04-17 PROCEDURE — 3078F DIAST BP <80 MM HG: CPT | Performed by: FAMILY MEDICINE

## 2024-04-17 PROCEDURE — 1036F TOBACCO NON-USER: CPT | Performed by: FAMILY MEDICINE

## 2024-04-17 PROCEDURE — 3077F SYST BP >= 140 MM HG: CPT | Performed by: FAMILY MEDICINE

## 2024-04-17 PROCEDURE — 99213 OFFICE O/P EST LOW 20 MIN: CPT | Performed by: FAMILY MEDICINE

## 2024-04-17 PROCEDURE — 1160F RVW MEDS BY RX/DR IN RCRD: CPT | Performed by: FAMILY MEDICINE

## 2024-04-17 PROCEDURE — 1159F MED LIST DOCD IN RCRD: CPT | Performed by: FAMILY MEDICINE

## 2024-04-17 PROCEDURE — 1125F AMNT PAIN NOTED PAIN PRSNT: CPT | Performed by: FAMILY MEDICINE

## 2024-04-17 ASSESSMENT — ENCOUNTER SYMPTOMS
DEPRESSION: 0
OCCASIONAL FEELINGS OF UNSTEADINESS: 1
LOSS OF SENSATION IN FEET: 0

## 2024-04-17 ASSESSMENT — PAIN SCALES - GENERAL: PAINLEVEL: 5

## 2024-04-17 NOTE — PATIENT INSTRUCTIONS
PAIN MANAGEMENT IN ADULTS    What is pain? Pain is your body’s way to reacting to injury or illness. Everybody reacts to pain in different ways. What you think is painful may not be painful to someone else. But, pain is whatever you say it is!  What causes pain? Many things, such as an injury, surgery, or a disease can cause pain. Some pain is caused by pressure one a nerve, such as a cancerous tumor or slipped disc. Other pain is caused when nerves are cut as in an accident or surgery. After an injury or surgery you may not want to move the painful part of our body at all. But, you may have pain because you are not moving this body part. Sometimes there is no clear reason for your pain.    What are the different types of pain?  Acute pain is short?lived and lasts less than 3 months. Caregivers help first work to remove the cause of the pain, such as fixing a broken arm. Acute pain usually can be controlled or stopped with pain medicine.  Chronic pain lasts longer than 3?6 months. This kind of pain is often more complicated. Caregivers may use medicine along with other treatments, like self?hypnosis and relaxation to help you learn to deal with the chronic pain.  What is your pain like? Caregivers want you to talk to them about your pain. This helps them learn what may be causing the pain and how best to treat it.  Why is pain control important? Pain can affect your appetite, how well you sleep, your energy and your ability to do things. Pain can also affect your mood and relationship with others. If caregivers can help you control your pain, you will suffer less and can even heal faster.  How can pain medicine be given? Following are the many different ways pain medicine can be given depending on the kind of pain you are experiencing.  By mouth: you may be given pills or liquid to swallow or you may be given a pill or liquid to put under your tongue. Some medicine can also be given as a lozenge  like a cough drop or even a special lollipop.  Rectal: medicine in a suppository is put into your rectum.  Shot/Injection: pain medicine can be given as a shot/injection into an IV, into a muscle or under the skin  Topical: medicine in a cream or gel is spread over the skin.  Transdermal: medicine given in a patch and put onto the skin. This medicine is released slowly to give pain relief for as long as 72 hours.    How can you take pain medicine safely and make it work best for you? The following are many different ways pain medicine can be given depending on the kind of pain you have.  Some pain medicines can make you breathe less deeply and less often. The medicine may also make you sleepy, dizzy, and unsafe to drive a car or use heavy equipment. For these reasons, it is very important to follow your caregiver’s advice on how to use this medicine.  Sometimes the pain is worse when you first wake up in the morning. This may happened if you did not have enough pain medicine in your blood stream to last through the night. Caregivers may tell you to take a dose of pain medicine during the night.  Some foods, alcohol, and other medicines may cause unpleasant side effects when you take pain medicine. Follow your caregiver’s advice about how to prevent these problems.  Pain medicine can make you constipated. Straining with a BM can make you pain worse. Do not try to push the BM out if it’s too hard. Contact your caregiver if you become constipated.  Other non?drug pain control methods. There are many pain control techniques that can held you deal with pain even if it does not go away completely. It is important to practice some of the techniques when you don’t have pain if possible. This will help the technique work better during an attack of pain.  Cold and heat: both cold and heat can help lessen some types of post?op pain. Caregivers will tell you if cold and/or hot packs will help your pain.  Distraction: teaches  you to focus your attention on something other than pain. Playing cards or games, talking and visiting family may relax you and keep you from thinking about pain.  Hydrotherapy: is a gentle water exercise program. It can strengthen muscles that are not injured and lessen inflammation. Talk to your physical therapist or your caregiver about starting a hydrotherapy program.  Massage  Music  Physical therapy  Rest  Surgery/Nerve blocks  Call your caregiver if you have any of the following problems:  The pain medicine you are taking makes you sleepier than usual or confused  You have new pain or the pain seems different than before  You have constipation  Care agreement: You have the right to help plan your care. To help with this plan you must learn about your pain, what is causing it, and how it can be treated. You can then discuss treatment options with your caregivers. Work with them to decide what care will be used to treat you. You always have the right to refuse treatment.

## 2024-04-17 NOTE — PROGRESS NOTES
Subjective   Patient ID: Fortino Hastings is a 78 y.o. male who presents for Neuro Exam for MRI Approval.    HPI   He presents today to discuss the low back pain.  He states it is in the right side of the back can radiate down into the posterior hip and then down into the leg as well.  He suffered with this for some time.  He has been seeing a chiropractor, Dr. Castañeda, every 3 years for the last 2 years.  He completed a 6-week course of physical therapy starting at the end of last year into the beginning of this.  That was not very effective for him.  He does an exercise class at the [facility] every Tuesday and Thursday, which includes stretching, but that has not been that effective.  He wears a brace for his back I comes around the front as well.  He uses a cane because he cannot always walk that far due to the pain.    He has numbness in the third fourth and fifth toes bilaterally.    He had an episode in [facility] recently where his lower legs just froze up on him and he really was not able to move them.  He was able to get to a chair and sit down in the okay.  He always feels unsteady on his feet and the legs can be weak.    He did have a CT scan of the lumbar region on Emily 10, 202 which showed severe multilevel degenerative disc disease most notably at L2-L3, L3-L4, L4-L5, and L5-S1.  There was suspicion of significant foraminal stenosis most notably at L4-L5 and L3-L4 on the left.  The radiologist advised an MRI to evaluate for the nerve root impingement.    Review of Systems    Objective   /72   Pulse 63   Wt 119 kg (262 lb 3.2 oz)   SpO2 96%   BMI 36.57 kg/m²     Physical Exam  Constitutional:       Appearance: Normal appearance. He is not ill-appearing.   Pulmonary:      Effort: Pulmonary effort is normal.   Neurological:      Mental Status: He is alert.      Sensory: Sensory deficit (To light touch, he does not feel the third fourth and fifth toes (the lateral toes) much to light touch.)  present.      Deep Tendon Reflexes: Babinski sign absent on the right side. Babinski sign absent on the left side.      Reflex Scores:       Patellar reflexes are 2+ on the right side and 0 on the left side.       Achilles reflexes are 0 on the right side and 2+ on the left side.     Comments: I tested muscle strength of his plantar and dorsiflexion, knee extension and flexion, hip extension and flexion.  He did have somewhat decreased knee flexion strength in the left leg.       Assessment/Plan   Diagnoses and all orders for this visit:  Chronic right-sided low back pain with right-sided sciatica  Numbness of toes  Weakness of both legs  He has not responded to physical therapy, exercise/stretching classes, or chiropractic care.  He has decreased reflexes in the patella on the left and the Achilles reflex on the right.  He has overall weakness in the lower extremities when walking.  I do recommend an MRI to assess the back to see us if there is any nerve impingement or spinal stenosis.  This is in keeping with the recommendation of the radiologist in June of last year for an MRI to rule out nerve impingement.

## 2024-04-29 ENCOUNTER — APPOINTMENT (OUTPATIENT)
Dept: RADIOLOGY | Facility: HOSPITAL | Age: 79
End: 2024-04-29
Payer: MEDICARE

## 2024-04-29 ENCOUNTER — HOSPITAL ENCOUNTER (OUTPATIENT)
Dept: RADIOLOGY | Facility: HOSPITAL | Age: 79
Discharge: HOME | End: 2024-04-29
Payer: MEDICARE

## 2024-04-29 DIAGNOSIS — M54.41 CHRONIC RIGHT-SIDED LOW BACK PAIN WITH RIGHT-SIDED SCIATICA: ICD-10-CM

## 2024-04-29 DIAGNOSIS — G89.29 CHRONIC RIGHT-SIDED LOW BACK PAIN WITH RIGHT-SIDED SCIATICA: ICD-10-CM

## 2024-04-29 DIAGNOSIS — R29.898 WEAKNESS OF RIGHT LEG: ICD-10-CM

## 2024-04-29 DIAGNOSIS — R20.0 NUMBNESS OF TOES: ICD-10-CM

## 2024-04-29 PROCEDURE — 72148 MRI LUMBAR SPINE W/O DYE: CPT | Performed by: RADIOLOGY

## 2024-04-29 PROCEDURE — 72148 MRI LUMBAR SPINE W/O DYE: CPT

## 2024-05-01 ENCOUNTER — OFFICE VISIT (OUTPATIENT)
Dept: PRIMARY CARE | Facility: CLINIC | Age: 79
End: 2024-05-01
Payer: MEDICARE

## 2024-05-01 VITALS
OXYGEN SATURATION: 94 % | WEIGHT: 259.6 LBS | BODY MASS INDEX: 36.21 KG/M2 | SYSTOLIC BLOOD PRESSURE: 132 MMHG | DIASTOLIC BLOOD PRESSURE: 68 MMHG | HEART RATE: 69 BPM

## 2024-05-01 DIAGNOSIS — G89.29 CHRONIC RIGHT-SIDED LOW BACK PAIN WITH RIGHT-SIDED SCIATICA: Primary | ICD-10-CM

## 2024-05-01 DIAGNOSIS — M54.41 CHRONIC RIGHT-SIDED LOW BACK PAIN WITH RIGHT-SIDED SCIATICA: Primary | ICD-10-CM

## 2024-05-01 PROCEDURE — 99213 OFFICE O/P EST LOW 20 MIN: CPT | Performed by: FAMILY MEDICINE

## 2024-05-01 PROCEDURE — 3078F DIAST BP <80 MM HG: CPT | Performed by: FAMILY MEDICINE

## 2024-05-01 PROCEDURE — 1160F RVW MEDS BY RX/DR IN RCRD: CPT | Performed by: FAMILY MEDICINE

## 2024-05-01 PROCEDURE — 1159F MED LIST DOCD IN RCRD: CPT | Performed by: FAMILY MEDICINE

## 2024-05-01 PROCEDURE — 1126F AMNT PAIN NOTED NONE PRSNT: CPT | Performed by: FAMILY MEDICINE

## 2024-05-01 PROCEDURE — 3075F SYST BP GE 130 - 139MM HG: CPT | Performed by: FAMILY MEDICINE

## 2024-05-01 RX ORDER — CYCLOBENZAPRINE HCL 10 MG
10 TABLET ORAL 2 TIMES DAILY PRN
COMMUNITY
End: 2024-05-17

## 2024-05-01 ASSESSMENT — ENCOUNTER SYMPTOMS
DEPRESSION: 0
LOSS OF SENSATION IN FEET: 0
OCCASIONAL FEELINGS OF UNSTEADINESS: 0

## 2024-05-01 ASSESSMENT — PATIENT HEALTH QUESTIONNAIRE - PHQ9
SUM OF ALL RESPONSES TO PHQ9 QUESTIONS 1 AND 2: 0
2. FEELING DOWN, DEPRESSED OR HOPELESS: NOT AT ALL
1. LITTLE INTEREST OR PLEASURE IN DOING THINGS: NOT AT ALL

## 2024-05-01 ASSESSMENT — PAIN SCALES - GENERAL: PAINLEVEL: 0-NO PAIN

## 2024-05-01 NOTE — PROGRESS NOTES
Subjective   Patient ID: Fortino Hastings is a 78 y.o. male who presents for Discuss MRI Results.    HPI   He presents today in follow-up from his MRI.  He does have some degenerative changes there and some disc disease but nothing new.  He continues to have the symptoms and the low back pain with some pain into his legs.  Wonders what else he could do.    Review of Systems    Objective   /68   Pulse 69   Wt 118 kg (259 lb 9.6 oz)   SpO2 94%   BMI 36.21 kg/m²     Physical Exam  Constitutional:       Appearance: Normal appearance. He is not ill-appearing.   Pulmonary:      Effort: Pulmonary effort is normal.   Neurological:      Mental Status: He is alert.   Psychiatric:         Mood and Affect: Mood normal.         Assessment/Plan   Diagnoses and all orders for this visit:  Chronic right-sided low back pain with right-sided sciatica  I referred him over to pain management, Dr. Ham.  Perhaps some corticosteroid injections might be helpful for him.

## 2024-05-02 ENCOUNTER — TELEPHONE (OUTPATIENT)
Dept: PRIMARY CARE | Facility: CLINIC | Age: 79
End: 2024-05-02
Payer: MEDICARE

## 2024-05-13 ENCOUNTER — HOSPITAL ENCOUNTER (OUTPATIENT)
Dept: RADIOLOGY | Facility: CLINIC | Age: 79
Discharge: HOME | End: 2024-05-13
Payer: MEDICARE

## 2024-05-13 DIAGNOSIS — M16.11 UNILATERAL PRIMARY OSTEOARTHRITIS, RIGHT HIP: ICD-10-CM

## 2024-05-13 PROCEDURE — 73502 X-RAY EXAM HIP UNI 2-3 VIEWS: CPT | Mod: RT

## 2024-05-13 PROCEDURE — 73502 X-RAY EXAM HIP UNI 2-3 VIEWS: CPT | Mod: RIGHT SIDE | Performed by: RADIOLOGY

## 2024-05-17 ENCOUNTER — TELEPHONE (OUTPATIENT)
Dept: PRIMARY CARE | Facility: CLINIC | Age: 79
End: 2024-05-17
Payer: MEDICARE

## 2024-06-27 ENCOUNTER — HOSPITAL ENCOUNTER (OUTPATIENT)
Dept: RADIOLOGY | Facility: HOSPITAL | Age: 79
Discharge: HOME | End: 2024-06-27
Payer: MEDICARE

## 2024-06-27 DIAGNOSIS — M16.11 UNILATERAL PRIMARY OSTEOARTHRITIS, RIGHT HIP: ICD-10-CM

## 2024-06-27 PROCEDURE — 73723 MRI JOINT LWR EXTR W/O&W/DYE: CPT | Mod: RT

## 2024-06-27 PROCEDURE — A9575 INJ GADOTERATE MEGLUMI 0.1ML: HCPCS | Performed by: PHYSICAL MEDICINE & REHABILITATION

## 2024-06-27 PROCEDURE — 2550000001 HC RX 255 CONTRASTS: Performed by: PHYSICAL MEDICINE & REHABILITATION

## 2024-06-27 RX ORDER — GADOTERATE MEGLUMINE 376.9 MG/ML
24 INJECTION INTRAVENOUS
Status: COMPLETED | OUTPATIENT
Start: 2024-06-27 | End: 2024-06-27

## 2024-07-11 DIAGNOSIS — E03.9 ACQUIRED HYPOTHYROIDISM: ICD-10-CM

## 2024-07-11 DIAGNOSIS — I10 PRIMARY HYPERTENSION: ICD-10-CM

## 2024-07-11 DIAGNOSIS — I25.10 CORONARY ARTERY DISEASE, UNSPECIFIED VESSEL OR LESION TYPE, UNSPECIFIED WHETHER ANGINA PRESENT, UNSPECIFIED WHETHER NATIVE OR TRANSPLANTED HEART: ICD-10-CM

## 2024-07-11 RX ORDER — LEVOTHYROXINE SODIUM 75 UG/1
75 TABLET ORAL DAILY
Qty: 90 TABLET | Refills: 1 | Status: SHIPPED | OUTPATIENT
Start: 2024-07-11 | End: 2024-07-12 | Stop reason: SDUPTHER

## 2024-07-11 RX ORDER — LISINOPRIL 10 MG/1
10 TABLET ORAL DAILY
Qty: 90 TABLET | Refills: 1 | Status: SHIPPED | OUTPATIENT
Start: 2024-07-11

## 2024-07-11 RX ORDER — METOPROLOL TARTRATE 25 MG/1
25 TABLET, FILM COATED ORAL 2 TIMES DAILY
Qty: 180 TABLET | Refills: 1 | Status: SHIPPED | OUTPATIENT
Start: 2024-07-11 | End: 2024-07-12 | Stop reason: SDUPTHER

## 2024-07-11 RX ORDER — CLOPIDOGREL BISULFATE 75 MG/1
75 TABLET ORAL DAILY
Qty: 90 TABLET | Refills: 1 | Status: SHIPPED | OUTPATIENT
Start: 2024-07-11 | End: 2024-07-12 | Stop reason: SDUPTHER

## 2024-07-11 RX ORDER — ROSUVASTATIN CALCIUM 10 MG/1
10 TABLET, COATED ORAL EVERY 24 HOURS
Qty: 90 TABLET | Refills: 1 | Status: SHIPPED | OUTPATIENT
Start: 2024-07-11 | End: 2024-07-12 | Stop reason: SDUPTHER

## 2024-07-11 NOTE — TELEPHONE ENCOUNTER
LOV 5/1/24 for lower back pain.  Left message with spouse for pt to call back and schedule an appt as he is due and also will be due for labs in December.  Lipid panel hasn't been checked since 1/2023.  RX tabs set for 90 days with 1 refill.

## 2024-07-12 DIAGNOSIS — I10 PRIMARY HYPERTENSION: ICD-10-CM

## 2024-07-12 DIAGNOSIS — I25.10 CORONARY ARTERY DISEASE, UNSPECIFIED VESSEL OR LESION TYPE, UNSPECIFIED WHETHER ANGINA PRESENT, UNSPECIFIED WHETHER NATIVE OR TRANSPLANTED HEART: ICD-10-CM

## 2024-07-12 DIAGNOSIS — E03.9 ACQUIRED HYPOTHYROIDISM: ICD-10-CM

## 2024-07-12 RX ORDER — CLOPIDOGREL BISULFATE 75 MG/1
75 TABLET ORAL DAILY
Qty: 90 TABLET | Refills: 1 | Status: SHIPPED | OUTPATIENT
Start: 2024-07-12

## 2024-07-12 RX ORDER — ROSUVASTATIN CALCIUM 10 MG/1
10 TABLET, COATED ORAL EVERY 24 HOURS
Qty: 90 TABLET | Refills: 1 | Status: SHIPPED | OUTPATIENT
Start: 2024-07-12

## 2024-07-12 RX ORDER — LEVOTHYROXINE SODIUM 75 UG/1
75 TABLET ORAL DAILY
Qty: 90 TABLET | Refills: 1 | Status: SHIPPED | OUTPATIENT
Start: 2024-07-12

## 2024-07-12 RX ORDER — METOPROLOL TARTRATE 25 MG/1
25 TABLET, FILM COATED ORAL 2 TIMES DAILY
Qty: 180 TABLET | Refills: 1 | Status: SHIPPED | OUTPATIENT
Start: 2024-07-12

## 2024-07-16 ENCOUNTER — APPOINTMENT (OUTPATIENT)
Dept: PRIMARY CARE | Facility: CLINIC | Age: 79
End: 2024-07-16
Payer: MEDICARE

## 2024-07-22 ENCOUNTER — OFFICE VISIT (OUTPATIENT)
Dept: PRIMARY CARE | Facility: CLINIC | Age: 79
End: 2024-07-22
Payer: MEDICARE

## 2024-07-22 VITALS
OXYGEN SATURATION: 95 % | HEART RATE: 60 BPM | DIASTOLIC BLOOD PRESSURE: 70 MMHG | SYSTOLIC BLOOD PRESSURE: 122 MMHG | BODY MASS INDEX: 36.32 KG/M2 | WEIGHT: 260.4 LBS

## 2024-07-22 DIAGNOSIS — G25.0 ESSENTIAL TREMOR: ICD-10-CM

## 2024-07-22 DIAGNOSIS — E11.9 TYPE 2 DIABETES MELLITUS WITHOUT COMPLICATION, UNSPECIFIED WHETHER LONG TERM INSULIN USE (MULTI): ICD-10-CM

## 2024-07-22 DIAGNOSIS — E03.9 ACQUIRED HYPOTHYROIDISM: ICD-10-CM

## 2024-07-22 DIAGNOSIS — G89.29 CHRONIC RIGHT HIP PAIN: ICD-10-CM

## 2024-07-22 DIAGNOSIS — I10 PRIMARY HYPERTENSION: Primary | ICD-10-CM

## 2024-07-22 DIAGNOSIS — N40.0 BENIGN PROSTATIC HYPERPLASIA WITHOUT URINARY OBSTRUCTION: ICD-10-CM

## 2024-07-22 DIAGNOSIS — M25.551 CHRONIC RIGHT HIP PAIN: ICD-10-CM

## 2024-07-22 DIAGNOSIS — N39.43 DRIBBLING URINE: ICD-10-CM

## 2024-07-22 LAB — POC HEMOGLOBIN A1C: 6.2 % (ref 4.2–6.5)

## 2024-07-22 PROCEDURE — 1036F TOBACCO NON-USER: CPT | Performed by: FAMILY MEDICINE

## 2024-07-22 PROCEDURE — 1159F MED LIST DOCD IN RCRD: CPT | Performed by: FAMILY MEDICINE

## 2024-07-22 PROCEDURE — 1126F AMNT PAIN NOTED NONE PRSNT: CPT | Performed by: FAMILY MEDICINE

## 2024-07-22 PROCEDURE — 83036 HEMOGLOBIN GLYCOSYLATED A1C: CPT | Performed by: FAMILY MEDICINE

## 2024-07-22 PROCEDURE — 3078F DIAST BP <80 MM HG: CPT | Performed by: FAMILY MEDICINE

## 2024-07-22 PROCEDURE — 3074F SYST BP LT 130 MM HG: CPT | Performed by: FAMILY MEDICINE

## 2024-07-22 PROCEDURE — 1123F ACP DISCUSS/DSCN MKR DOCD: CPT | Performed by: FAMILY MEDICINE

## 2024-07-22 PROCEDURE — 1157F ADVNC CARE PLAN IN RCRD: CPT | Performed by: FAMILY MEDICINE

## 2024-07-22 PROCEDURE — 99214 OFFICE O/P EST MOD 30 MIN: CPT | Performed by: FAMILY MEDICINE

## 2024-07-22 PROCEDURE — 1158F ADVNC CARE PLAN TLK DOCD: CPT | Performed by: FAMILY MEDICINE

## 2024-07-22 ASSESSMENT — PAIN SCALES - GENERAL: PAINLEVEL: 0-NO PAIN

## 2024-07-22 ASSESSMENT — ENCOUNTER SYMPTOMS
PALPITATIONS: 0
ACTIVITY CHANGE: 0
SHORTNESS OF BREATH: 0
COUGH: 0
HEADACHES: 0
DIZZINESS: 0
CHEST TIGHTNESS: 0

## 2024-07-22 NOTE — PATIENT INSTRUCTIONS
Continue you current med. Follow up in 6 months, with blood tests just before that.     Continue your home exercise plan.    For the dribbling of urine, after you're done urinating reach behind the scrotum and push up and pull forward and the extra urine should come out .

## 2024-07-22 NOTE — PROGRESS NOTES
Subjective   Patient ID: Fortino Hastings is a 79 y.o. male who presents for Diabetes.    Diabetes  Pertinent negatives for hypoglycemia include no dizziness or headaches. Pertinent negatives for diabetes include no chest pain.      He is here for follow-up.  I reviewed his lab test from December 11 of last year and his cholesterol from January 2023.  TSH in December was also normal.    Overall, he's been doing better. He's been doing his own rehab program with the treadmill and walker and is able to improve the hip pain quite a bit. Pain is much better.  Does have some dribbling of urine right after he goes.     Tremor controlled.   Some SOB if he does too much, but usually good.     Review of Systems   Constitutional:  Negative for activity change.   Respiratory:  Negative for cough, chest tightness and shortness of breath.    Cardiovascular:  Negative for chest pain, palpitations and leg swelling.   Neurological:  Negative for dizziness and headaches.       Objective   /68   Pulse 60   Wt 118 kg (260 lb 6.4 oz)   SpO2 95%   BMI 36.32 kg/m²     Physical Exam  Constitutional:       Appearance: Normal appearance.   Neck:      Thyroid: No thyromegaly or thyroid tenderness.      Vascular: No carotid bruit.   Cardiovascular:      Rate and Rhythm: Normal rate and regular rhythm.      Heart sounds: No murmur heard.  Pulmonary:      Effort: Pulmonary effort is normal.      Breath sounds: Normal breath sounds.   Musculoskeletal:      Cervical back: Neck supple.   Neurological:      Mental Status: He is alert.   Psychiatric:         Mood and Affect: Mood normal.       Assessment/Plan   Diagnoses and all orders for this visit:  Primary hypertension  -     Comprehensive Metabolic Panel; Future  Type 2 diabetes mellitus without complication, unspecified whether long term insulin use (Multi)  -     POCT glycosylated hemoglobin (Hb A1C) manually resulted  -     Hemoglobin A1C; Future  -     Albumin-Creatinine Ratio,  Urine Random; Future  Essential tremor  Acquired hypothyroidism  -     TSH with reflex to Free T4 if abnormal; Future  Benign prostatic hyperplasia without urinary obstruction  Dribbling urine  Chronic right hip pain  He'll continue to follow BP at home, where it's < 140/90 most days. He'll call if it elevates abover that.   Glucose controlled.   Taught him some help for the dribbling urine.   Continue current meds and his current exercise routin.

## 2024-08-16 DIAGNOSIS — N40.0 BENIGN PROSTATIC HYPERPLASIA WITHOUT URINARY OBSTRUCTION: Primary | ICD-10-CM

## 2024-08-19 RX ORDER — TAMSULOSIN HYDROCHLORIDE 0.4 MG/1
0.4 CAPSULE ORAL EVERY 24 HOURS
Qty: 90 CAPSULE | Refills: 1 | Status: SHIPPED | OUTPATIENT
Start: 2024-08-19

## 2024-08-28 ENCOUNTER — APPOINTMENT (OUTPATIENT)
Dept: PULMONOLOGY | Facility: CLINIC | Age: 79
End: 2024-08-28
Payer: MEDICARE

## 2024-08-28 VITALS
OXYGEN SATURATION: 93 % | HEART RATE: 65 BPM | BODY MASS INDEX: 36.01 KG/M2 | WEIGHT: 258.2 LBS | DIASTOLIC BLOOD PRESSURE: 72 MMHG | SYSTOLIC BLOOD PRESSURE: 129 MMHG

## 2024-08-28 DIAGNOSIS — R93.89 ABNORMAL CT OF THE CHEST: Primary | ICD-10-CM

## 2024-08-28 DIAGNOSIS — J44.9 CHRONIC OBSTRUCTIVE PULMONARY DISEASE, UNSPECIFIED COPD TYPE (MULTI): ICD-10-CM

## 2024-08-28 DIAGNOSIS — J43.2 CENTRILOBULAR EMPHYSEMA (MULTI): ICD-10-CM

## 2024-08-28 PROCEDURE — 1159F MED LIST DOCD IN RCRD: CPT | Performed by: NURSE PRACTITIONER

## 2024-08-28 PROCEDURE — 1160F RVW MEDS BY RX/DR IN RCRD: CPT | Performed by: NURSE PRACTITIONER

## 2024-08-28 PROCEDURE — 99214 OFFICE O/P EST MOD 30 MIN: CPT | Performed by: NURSE PRACTITIONER

## 2024-08-28 PROCEDURE — 1157F ADVNC CARE PLAN IN RCRD: CPT | Performed by: NURSE PRACTITIONER

## 2024-08-28 PROCEDURE — 3078F DIAST BP <80 MM HG: CPT | Performed by: NURSE PRACTITIONER

## 2024-08-28 PROCEDURE — 3074F SYST BP LT 130 MM HG: CPT | Performed by: NURSE PRACTITIONER

## 2024-08-28 PROCEDURE — 1036F TOBACCO NON-USER: CPT | Performed by: NURSE PRACTITIONER

## 2024-08-28 RX ORDER — ALBUTEROL SULFATE 90 UG/1
2 INHALANT RESPIRATORY (INHALATION) EVERY 4 HOURS PRN
Qty: 32 G | Refills: 3 | Status: SHIPPED | OUTPATIENT
Start: 2024-08-28

## 2024-08-28 RX ORDER — GLYCOPYRROLATE AND FORMOTEROL FUMARATE 9; 4.8 UG/1; UG/1
2 AEROSOL, METERED RESPIRATORY (INHALATION) 2 TIMES DAILY
Qty: 30 G | Refills: 3 | Status: SHIPPED | OUTPATIENT
Start: 2024-08-28

## 2024-08-28 NOTE — PROGRESS NOTES
Subjective   Patient ID: Fortino Hastings is a 79 y.o. male who presents for No chief complaint on file..  HPI  HPI: New patient here today for follow-up after hospitalization. He was hospitalized with pneumonia. On a chest CT he had quite a bit of middle lobe collapse. He also has emphysema. Patient does have albuterol at home he does get short of breath. I would like him to have a follow-up CT in 3 months at that time if the middle lobe area is still present then he may need a PET scan and EBUS.    03/16/23 he is here today for follow-up. He had a new chest CT which shows considerable improvement in the right middle lobe collapse. It is almost completely resolved but I would like to do another CT in 6 months. He had a PFT which shows an FEV1 of 74%. He was started on Stiolto which he really does like and he uses his albuterol in the evenings before bed which controls his wheezing. He had a lot of questions today with his wife I was able to answer these. 10 minutes spent preparing patient's chart. 30 minutes spent with patient answering questions and determining care. 10 minutes spent charting and ordering tests and medications    09/21/23 he is here today for follow-up. He had a new chest CT which does show improvement of the right middle lobe. He does have some very small nodules 4 and 5 mm. He is doing well with the Stiolto he uses his albuterol once a day. He tries to remain active. 5 minutes spent preparing patient's chart. 20 minutes spent with patient answering questions and determining care. 5 minutes spent charting and ordering tests and medications     03/28/24 he is here today for follow-up.  Unfortunately insurance would no longer cover his Stiolto so we had to change him to Anoro.  It is expensive for him.  It looks like insurance will cover Bevespi also I encouraged him to call insurance and see what his cost would be for the Bevespi.  Overall he has been doing well.  He just had carpal tunnel surgery  and is healing nicely.      08/28/24       Previous pulmonary history:   He has no history of recurrent infections, or lung disease as a child. He had no previous lung hx, never on oxygen or inhaler therapy. He currently is on no supplemental oxygen.      Inhalers/nebulized medications: albuterol      Hospitalization History:  He has been hospitalized over the last year for breathing related problem.     Sleep history:  Denies snoring, apneas. Admits to feeling tired during the day.   Does take frequent naps.  STOP-BANG score of 4     Comorbidities:   HTN  Hypothyroidism    SH:  smoking: former   drinking: none  illicit drug use: none     Occupation: (Full questionnaire on exposures obtained, discussed with the patient and scanned to EMR)  worked as retired teacher   No known exposure to asbestos, silica or beryllium     Family History:  No family history of lung diseases or cancer     Imaging history: (I have personally reviewed the imaging below)  12/1/22 Ct for PE consolidation in the middle lobe marked patchy and nodular infiltration in the middle lobe as well as the medial upper lobe. Several areas of large bulla. Also some consolidation in the left lung base. Prominent paratracheal nodes and enlarged hilar nodes     PFTs: PENDING   // -> FEV1/FVC ratio/FEV1 (no BD response)/FVC/DLCO /TLC/RV to TLC ratio     6 MWTs: None on record     Lung biopsy: None on record     Echo: None on record  -> Normal EF, diastolic dysfunction, with normal LA, RV size and function     Labs:  : Hb 12.5 , Eos <250, Bicarb 24 , Creat 0.80       Review of Systems   All other systems reviewed and are negative.      Objective   Physical Exam  Vitals and nursing note reviewed.   Constitutional:       Appearance: Normal appearance. He is obese.   HENT:      Head: Normocephalic.      Nose: Nose normal.   Eyes:      Pupils: Pupils are equal, round, and reactive to light.   Cardiovascular:      Rate and Rhythm: Normal rate.   Pulmonary:       Effort: Pulmonary effort is normal.      Breath sounds: Normal breath sounds.   Neurological:      General: No focal deficit present.      Mental Status: He is alert and oriented to person, place, and time. Mental status is at baseline.   Psychiatric:         Mood and Affect: Mood normal.         Behavior: Behavior normal.         Thought Content: Thought content normal.         Judgment: Judgment normal.         Assessment/Plan     # COPD with emphysema: - chest CT shows emphysema with bulla  -will screen for A1AT deficiency in clinic today (genetic screen)  -FEV1 post-bd of , GOLD stage  -At baseline with no active sign of exacerbation (increased dyspnea, cough, sputum or change in sputum characteristic)  -Advanced therapies including LVRS and Lung transplantation.  -Counseled on the role of diet and exercise  -Pulmonary rehab referral made.  -Vaccinations: Yearly influenza vaccines, Pneumoccocal (both PCV13 and PPSV23 for all patients 65 y.o or above)  -Depression score.  -Sleep evaluation as below.  -Echo to evaluate for core pulmonale.  -Does not need oxygen at rest. Oxygen need evaluation with walking and sleep (nighttime oximetry)   -He has albuterol I will start him on Stiolto or Anoro whichever his insurance will cover   - 03/16/23 he is using Stiolto and has noticed an improvement. He uses his albuterol every night before bed  09/21/23 he is doing very well with the Stiolto he uses that regularly uses his albuterol about once a day  03/28/24 insurance changed him to Anoro but it is very expensive.  Insurance will also cover Bevespi he is going to call and find out what his cost would be for Bevespi if it is less we can certainly change him  08/287/24 he is using Bevespi with good response, needs refills today. Uses albuterol as needed.     #Lung nodules   - With right middle lobe collapse  09/21/23 chest CT does show improvement of the right middle lobe also has some small nodules    #Post hospitalization  for pneumonia   -Chest CT did show right middle lobe collapse along with emphysema and bullae   -We will repeat a chest CT in 3 months   -Patient may need a PET scan and an EBUS if the area still persists   - 03/16/23 the right middle lobe does show improvement although not complete resolution. I would like a new CT in 6 months -shows a 4 mm nodule in the right lower lobe  03/28/24 his last chest CT did show considerable improvement.  We discussed doing a CT before his next visit but we are actually going to wait and talk about it at his next visit.  08/28/24 we discussed it and he is feeling well and wants to wait instead since the RML improved on last CT     Mr. Hastings it was a pleasure seeing you in the office today. We discussed the following:       - Please continue your Anoro, you use this every day   - You have your albuterol to use when you feel short of breath   - Please stay active    Follow up in 6 months   Please follow-up in 6 months with Dr. Ceron for your COPD       JERICA Garcia-JOSELO 08/28/24 2:06 PM

## 2024-09-19 DIAGNOSIS — I25.10 CORONARY ARTERY DISEASE, UNSPECIFIED VESSEL OR LESION TYPE, UNSPECIFIED WHETHER ANGINA PRESENT, UNSPECIFIED WHETHER NATIVE OR TRANSPLANTED HEART: ICD-10-CM

## 2024-09-20 RX ORDER — LISINOPRIL 10 MG/1
10 TABLET ORAL DAILY
Qty: 90 TABLET | Refills: 1 | Status: SHIPPED | OUTPATIENT
Start: 2024-09-20

## 2024-09-25 ENCOUNTER — CLINICAL SUPPORT (OUTPATIENT)
Dept: PRIMARY CARE | Facility: CLINIC | Age: 79
End: 2024-09-25
Payer: MEDICARE

## 2024-09-25 DIAGNOSIS — Z23 NEED FOR INFLUENZA VACCINATION: ICD-10-CM

## 2024-09-25 PROCEDURE — 90662 IIV NO PRSV INCREASED AG IM: CPT | Performed by: FAMILY MEDICINE

## 2024-10-16 ENCOUNTER — TELEPHONE (OUTPATIENT)
Dept: PRIMARY CARE | Facility: CLINIC | Age: 79
End: 2024-10-16
Payer: MEDICARE

## 2024-11-04 ENCOUNTER — LAB (OUTPATIENT)
Dept: LAB | Facility: LAB | Age: 79
End: 2024-11-04
Payer: MEDICARE

## 2024-11-04 DIAGNOSIS — I25.2 HISTORY OF MYOCARDIAL INFARCTION: ICD-10-CM

## 2024-11-04 DIAGNOSIS — I25.119 ATHEROSCLEROSIS OF NATIVE CORONARY ARTERY OF NATIVE HEART WITH ANGINA PECTORIS: ICD-10-CM

## 2024-11-04 DIAGNOSIS — R53.83 EXHAUSTION: ICD-10-CM

## 2024-11-04 DIAGNOSIS — R06.02 SOB (SHORTNESS OF BREATH): ICD-10-CM

## 2024-11-04 DIAGNOSIS — I20.9 ANGINA PECTORIS: ICD-10-CM

## 2024-11-04 DIAGNOSIS — Z79.02 ANTIPLATELET OR ANTITHROMBOTIC LONG-TERM USE: ICD-10-CM

## 2024-11-04 LAB
ANION GAP SERPL CALC-SCNC: 13 MMOL/L (ref 10–20)
APTT PPP: 29 SECONDS (ref 27–38)
BUN SERPL-MCNC: 16 MG/DL (ref 6–23)
CALCIUM SERPL-MCNC: 8.7 MG/DL (ref 8.6–10.3)
CHLORIDE SERPL-SCNC: 106 MMOL/L (ref 98–107)
CO2 SERPL-SCNC: 25 MMOL/L (ref 21–32)
CREAT SERPL-MCNC: 0.76 MG/DL (ref 0.5–1.3)
EGFRCR SERPLBLD CKD-EPI 2021: >90 ML/MIN/1.73M*2
ERYTHROCYTE [DISTWIDTH] IN BLOOD BY AUTOMATED COUNT: 12.7 % (ref 11.5–14.5)
GLUCOSE SERPL-MCNC: 129 MG/DL (ref 74–99)
HCT VFR BLD AUTO: 41.4 % (ref 41–52)
HGB BLD-MCNC: 13.9 G/DL (ref 13.5–17.5)
INR PPP: 1.1 (ref 0.9–1.1)
MCH RBC QN AUTO: 31.4 PG (ref 26–34)
MCHC RBC AUTO-ENTMCNC: 33.6 G/DL (ref 32–36)
MCV RBC AUTO: 94 FL (ref 80–100)
NRBC BLD-RTO: 0 /100 WBCS (ref 0–0)
PLATELET # BLD AUTO: 289 X10*3/UL (ref 150–450)
POTASSIUM SERPL-SCNC: 4.2 MMOL/L (ref 3.5–5.3)
PROTHROMBIN TIME: 12.4 SECONDS (ref 9.8–12.8)
RBC # BLD AUTO: 4.43 X10*6/UL (ref 4.5–5.9)
SODIUM SERPL-SCNC: 140 MMOL/L (ref 136–145)
WBC # BLD AUTO: 9.7 X10*3/UL (ref 4.4–11.3)

## 2024-11-04 PROCEDURE — 36415 COLL VENOUS BLD VENIPUNCTURE: CPT

## 2024-11-05 ENCOUNTER — OFFICE VISIT (OUTPATIENT)
Dept: PRIMARY CARE | Facility: CLINIC | Age: 79
End: 2024-11-05
Payer: MEDICARE

## 2024-11-05 VITALS
DIASTOLIC BLOOD PRESSURE: 64 MMHG | WEIGHT: 257.8 LBS | SYSTOLIC BLOOD PRESSURE: 128 MMHG | BODY MASS INDEX: 35.96 KG/M2 | HEART RATE: 64 BPM

## 2024-11-05 DIAGNOSIS — E11.9 TYPE 2 DIABETES MELLITUS WITHOUT COMPLICATION, UNSPECIFIED WHETHER LONG TERM INSULIN USE (MULTI): ICD-10-CM

## 2024-11-05 DIAGNOSIS — M75.21 BICEPS TENDINITIS OF RIGHT UPPER EXTREMITY: Primary | ICD-10-CM

## 2024-11-05 LAB — POC HEMOGLOBIN A1C: 6.4 % (ref 4.2–6.5)

## 2024-11-05 PROCEDURE — 3074F SYST BP LT 130 MM HG: CPT | Performed by: FAMILY MEDICINE

## 2024-11-05 PROCEDURE — 1125F AMNT PAIN NOTED PAIN PRSNT: CPT | Performed by: FAMILY MEDICINE

## 2024-11-05 PROCEDURE — 1036F TOBACCO NON-USER: CPT | Performed by: FAMILY MEDICINE

## 2024-11-05 PROCEDURE — 99213 OFFICE O/P EST LOW 20 MIN: CPT | Performed by: FAMILY MEDICINE

## 2024-11-05 PROCEDURE — 1158F ADVNC CARE PLAN TLK DOCD: CPT | Performed by: FAMILY MEDICINE

## 2024-11-05 PROCEDURE — 1159F MED LIST DOCD IN RCRD: CPT | Performed by: FAMILY MEDICINE

## 2024-11-05 PROCEDURE — 83036 HEMOGLOBIN GLYCOSYLATED A1C: CPT | Performed by: FAMILY MEDICINE

## 2024-11-05 PROCEDURE — 1157F ADVNC CARE PLAN IN RCRD: CPT | Performed by: FAMILY MEDICINE

## 2024-11-05 PROCEDURE — 1123F ACP DISCUSS/DSCN MKR DOCD: CPT | Performed by: FAMILY MEDICINE

## 2024-11-05 PROCEDURE — 3078F DIAST BP <80 MM HG: CPT | Performed by: FAMILY MEDICINE

## 2024-11-05 RX ORDER — POLYETHYLENE GLYCOL 3350 17 G/17G
17 POWDER, FOR SOLUTION ORAL DAILY
COMMUNITY

## 2024-11-05 RX ORDER — NITROGLYCERIN 0.4 MG/1
TABLET SUBLINGUAL
COMMUNITY
Start: 2024-10-17

## 2024-11-05 ASSESSMENT — PATIENT HEALTH QUESTIONNAIRE - PHQ9
2. FEELING DOWN, DEPRESSED OR HOPELESS: NOT AT ALL
1. LITTLE INTEREST OR PLEASURE IN DOING THINGS: NOT AT ALL
SUM OF ALL RESPONSES TO PHQ9 QUESTIONS 1 AND 2: 0

## 2024-11-05 ASSESSMENT — PAIN SCALES - GENERAL: PAINLEVEL_OUTOF10: 5

## 2024-11-05 NOTE — PATIENT INSTRUCTIONS
You may see the physical therapists for the shoulder pain. Appears to be bicep tendonitis. Continue heat on this. You or Michelle can also massage the area that's tender.

## 2024-11-05 NOTE — PROGRESS NOTES
Subjective   Patient ID: Fortino Hastings is a 79 y.o. male who presents for Shoulder Pain (Right shoulder).    HPI   He presents with pain in the right shoulder over the last many weeks.  States sometimes it will snap on him.  Sometimes will get pain that will go down into the bicep and sometimes up through the superior trapezius toward the neck.  He states not too bad today but was worse yesterday.  He states ibuprofen and Tylenol helps some.    Review of Systems    Objective   /64   Pulse 64   Wt 117 kg (257 lb 12.8 oz)   BMI 35.96 kg/m²     Physical Exam  Constitutional:       Appearance: He is not ill-appearing.   Pulmonary:      Effort: Pulmonary effort is normal.   Musculoskeletal:      Comments: He has a decent range of motion of the shoulder.  No tenderness over the rotator cuff or the AC joint.  No posterior tenderness to palpation.  He does have tenderness in the bicipital groove and pressure here reproduces his pain.   Neurological:      Mental Status: He is alert.         Assessment/Plan   Diagnoses and all orders for this visit:  Biceps tendinitis of right upper extremity  Type 2 diabetes mellitus without complication, unspecified whether long term insulin use (Multi)  -     POCT glycosylated hemoglobin (Hb A1C) manually resulted  Discussed physical therapy but he wants to continue to try and do some of the modalities he is done at home since it is seemingly improving.  He will continue with the heat.  I did tell him that Michelle can massage the areas firmly as he can tolerate which will help this improve as well.  If not, he can go over to see physical therapy.

## 2024-11-11 ENCOUNTER — HOSPITAL ENCOUNTER (OUTPATIENT)
Facility: HOSPITAL | Age: 79
Setting detail: OUTPATIENT SURGERY
Discharge: HOME | End: 2024-11-11
Attending: INTERNAL MEDICINE | Admitting: INTERNAL MEDICINE
Payer: MEDICARE

## 2024-11-11 VITALS
RESPIRATION RATE: 14 BRPM | OXYGEN SATURATION: 95 % | DIASTOLIC BLOOD PRESSURE: 56 MMHG | SYSTOLIC BLOOD PRESSURE: 143 MMHG | HEART RATE: 62 BPM

## 2024-11-11 DIAGNOSIS — I25.119 ATHEROSCLEROTIC HEART DISEASE OF NATIVE CORONARY ARTERY WITH ANGINA PECTORIS: ICD-10-CM

## 2024-11-11 DIAGNOSIS — Z98.61 HISTORY OF PTCA: ICD-10-CM

## 2024-11-11 DIAGNOSIS — I25.112 ATHEROSCLEROTIC HEART DISEASE OF NATIVE CORONARY ARTERY WITH REFRACTORY ANGINA PECTORIS: ICD-10-CM

## 2024-11-11 PROCEDURE — C1894 INTRO/SHEATH, NON-LASER: HCPCS | Performed by: INTERNAL MEDICINE

## 2024-11-11 PROCEDURE — 2500000004 HC RX 250 GENERAL PHARMACY W/ HCPCS (ALT 636 FOR OP/ED): Performed by: INTERNAL MEDICINE

## 2024-11-11 PROCEDURE — 7100000009 HC PHASE TWO TIME - INITIAL BASE CHARGE: Performed by: INTERNAL MEDICINE

## 2024-11-11 PROCEDURE — 2720000007 HC OR 272 NO HCPCS: Performed by: INTERNAL MEDICINE

## 2024-11-11 PROCEDURE — 99152 MOD SED SAME PHYS/QHP 5/>YRS: CPT | Performed by: INTERNAL MEDICINE

## 2024-11-11 PROCEDURE — G0269 OCCLUSIVE DEVICE IN VEIN ART: HCPCS | Mod: 59 | Performed by: INTERNAL MEDICINE

## 2024-11-11 PROCEDURE — 2550000001 HC RX 255 CONTRASTS: Performed by: INTERNAL MEDICINE

## 2024-11-11 PROCEDURE — C1760 CLOSURE DEV, VASC: HCPCS | Performed by: INTERNAL MEDICINE

## 2024-11-11 PROCEDURE — 2780000003 HC OR 278 NO HCPCS: Performed by: INTERNAL MEDICINE

## 2024-11-11 PROCEDURE — 93454 CORONARY ARTERY ANGIO S&I: CPT | Performed by: INTERNAL MEDICINE

## 2024-11-11 PROCEDURE — 93458 L HRT ARTERY/VENTRICLE ANGIO: CPT | Performed by: INTERNAL MEDICINE

## 2024-11-11 PROCEDURE — 2500000005 HC RX 250 GENERAL PHARMACY W/O HCPCS: Performed by: INTERNAL MEDICINE

## 2024-11-11 PROCEDURE — 7100000010 HC PHASE TWO TIME - EACH INCREMENTAL 1 MINUTE: Performed by: INTERNAL MEDICINE

## 2024-11-11 RX ORDER — LIDOCAINE HYDROCHLORIDE 20 MG/ML
INJECTION, SOLUTION INFILTRATION; PERINEURAL AS NEEDED
Status: DISCONTINUED | OUTPATIENT
Start: 2024-11-11 | End: 2024-11-11 | Stop reason: HOSPADM

## 2024-11-11 RX ORDER — MIDAZOLAM HYDROCHLORIDE 1 MG/ML
INJECTION, SOLUTION INTRAMUSCULAR; INTRAVENOUS AS NEEDED
Status: DISCONTINUED | OUTPATIENT
Start: 2024-11-11 | End: 2024-11-11 | Stop reason: HOSPADM

## 2024-11-11 RX ORDER — FENTANYL CITRATE 50 UG/ML
INJECTION, SOLUTION INTRAMUSCULAR; INTRAVENOUS AS NEEDED
Status: DISCONTINUED | OUTPATIENT
Start: 2024-11-11 | End: 2024-11-11 | Stop reason: HOSPADM

## 2024-11-11 ASSESSMENT — PAIN SCALES - GENERAL
PAINLEVEL_OUTOF10: 0 - NO PAIN

## 2024-11-11 ASSESSMENT — PAIN - FUNCTIONAL ASSESSMENT
PAIN_FUNCTIONAL_ASSESSMENT: 0-10

## 2024-11-11 ASSESSMENT — COLUMBIA-SUICIDE SEVERITY RATING SCALE - C-SSRS
2. HAVE YOU ACTUALLY HAD ANY THOUGHTS OF KILLING YOURSELF?: NO
6. HAVE YOU EVER DONE ANYTHING, STARTED TO DO ANYTHING, OR PREPARED TO DO ANYTHING TO END YOUR LIFE?: NO
1. IN THE PAST MONTH, HAVE YOU WISHED YOU WERE DEAD OR WISHED YOU COULD GO TO SLEEP AND NOT WAKE UP?: NO

## 2024-11-11 NOTE — SIGNIFICANT EVENT
Pt returned to APC 4 from lab 2, right femoral MYNX in place, pt resting comfortably, drinking water, no c/o at this time

## 2024-11-11 NOTE — SIGNIFICANT EVENT
Pt ambulatory to restroom and back to bed, no change in assessment.Pt getting dressed, spouse at bedside

## 2024-11-12 NOTE — SIGNIFICANT EVENT
Cath Lab Procedure Call Back  Procedure Date: __11/11/2024_____________   Procedure Performed:______LHC______________  Physician:_Dr.krishnan______________  Spoke with:_____________________________  Date/Time Contacted: 1st attempt: __9:35_______ ___:____ 2nd attempt: _________ ___:____  Phone#:_______________ Unable to reach/Left message:____X________  Access Site: Pain______Bleeding______Bruised______Infection______WNL______  Dressing Removal Date:____11/12/2024__________ Anticoagulation Post Intervention_________  Satisfied with Care:________________ D/C Instructions adequate_______________  Follow Up Appointment:_____________________ Length of Call:____3mins______________  Comments:_____Pt didn't answer the phone, and I wasn't able to leave a message. _________________________________________________________________________________________________________________________________________

## 2025-01-07 ENCOUNTER — TELEPHONE (OUTPATIENT)
Dept: PULMONOLOGY | Facility: CLINIC | Age: 80
End: 2025-01-07
Payer: MEDICARE

## 2025-01-07 DIAGNOSIS — I25.10 CORONARY ARTERY DISEASE, UNSPECIFIED VESSEL OR LESION TYPE, UNSPECIFIED WHETHER ANGINA PRESENT, UNSPECIFIED WHETHER NATIVE OR TRANSPLANTED HEART: ICD-10-CM

## 2025-01-07 DIAGNOSIS — J43.2 CENTRILOBULAR EMPHYSEMA (MULTI): Primary | ICD-10-CM

## 2025-01-07 DIAGNOSIS — I10 PRIMARY HYPERTENSION: ICD-10-CM

## 2025-01-07 DIAGNOSIS — N40.0 BENIGN PROSTATIC HYPERPLASIA WITHOUT URINARY OBSTRUCTION: ICD-10-CM

## 2025-01-07 RX ORDER — ROSUVASTATIN CALCIUM 10 MG/1
10 TABLET, COATED ORAL EVERY 24 HOURS
Qty: 90 TABLET | Refills: 1 | Status: SHIPPED | OUTPATIENT
Start: 2025-01-07

## 2025-01-07 RX ORDER — TAMSULOSIN HYDROCHLORIDE 0.4 MG/1
0.4 CAPSULE ORAL EVERY 24 HOURS
Qty: 90 CAPSULE | Refills: 1 | Status: SHIPPED | OUTPATIENT
Start: 2025-01-07

## 2025-01-07 RX ORDER — CLOPIDOGREL BISULFATE 75 MG/1
75 TABLET ORAL DAILY
Qty: 90 TABLET | Refills: 1 | Status: SHIPPED | OUTPATIENT
Start: 2025-01-07

## 2025-01-07 RX ORDER — METOPROLOL TARTRATE 25 MG/1
25 TABLET, FILM COATED ORAL 2 TIMES DAILY
Qty: 180 TABLET | Refills: 1 | Status: SHIPPED | OUTPATIENT
Start: 2025-01-07

## 2025-01-07 NOTE — TELEPHONE ENCOUNTER
Last OV:   7/22/24  Next OV:   None scheduled  Last Refill:   Clopidogrel 7/12/24  #90, 1 refill                       Metoprolol 7/12/24  #180, 1 refill                       Rosuvastatin 7/12/24  #90, 1 refill                       Tamsulosin 8/19/24  #90, 1 refill  Last Lipid Panel:  1/10/23

## 2025-01-07 NOTE — TELEPHONE ENCOUNTER
Pt needs Anora  and not the Brevestri- he had a bad experience from it  and doesn't want it back. He will need one sent to [facility] and 90 day sent to  [facility]. Thank You

## 2025-01-08 ENCOUNTER — LAB (OUTPATIENT)
Dept: LAB | Facility: LAB | Age: 80
End: 2025-01-08
Payer: MEDICARE

## 2025-01-08 DIAGNOSIS — E11.9 TYPE 2 DIABETES MELLITUS WITHOUT COMPLICATION, UNSPECIFIED WHETHER LONG TERM INSULIN USE (MULTI): ICD-10-CM

## 2025-01-08 DIAGNOSIS — E03.9 ACQUIRED HYPOTHYROIDISM: ICD-10-CM

## 2025-01-08 DIAGNOSIS — I10 PRIMARY HYPERTENSION: ICD-10-CM

## 2025-01-08 LAB
ALBUMIN SERPL BCP-MCNC: 4.2 G/DL (ref 3.4–5)
ALP SERPL-CCNC: 59 U/L (ref 33–136)
ALT SERPL W P-5'-P-CCNC: 14 U/L (ref 10–52)
ANION GAP SERPL CALC-SCNC: 14 MMOL/L (ref 10–20)
AST SERPL W P-5'-P-CCNC: 17 U/L (ref 9–39)
BILIRUB SERPL-MCNC: 0.5 MG/DL (ref 0–1.2)
BUN SERPL-MCNC: 16 MG/DL (ref 6–23)
CALCIUM SERPL-MCNC: 9.2 MG/DL (ref 8.6–10.3)
CHLORIDE SERPL-SCNC: 104 MMOL/L (ref 98–107)
CO2 SERPL-SCNC: 25 MMOL/L (ref 21–32)
CREAT SERPL-MCNC: 0.69 MG/DL (ref 0.5–1.3)
EGFRCR SERPLBLD CKD-EPI 2021: >90 ML/MIN/1.73M*2
GLUCOSE SERPL-MCNC: 145 MG/DL (ref 74–99)
POTASSIUM SERPL-SCNC: 3.9 MMOL/L (ref 3.5–5.3)
PROT SERPL-MCNC: 7.1 G/DL (ref 6.4–8.2)
SODIUM SERPL-SCNC: 139 MMOL/L (ref 136–145)
TSH SERPL-ACNC: 1.34 MIU/L (ref 0.44–3.98)

## 2025-01-08 PROCEDURE — 83036 HEMOGLOBIN GLYCOSYLATED A1C: CPT

## 2025-01-08 PROCEDURE — 82043 UR ALBUMIN QUANTITATIVE: CPT

## 2025-01-08 PROCEDURE — 82570 ASSAY OF URINE CREATININE: CPT

## 2025-01-09 LAB
CREAT UR-MCNC: 41.2 MG/DL (ref 20–370)
EST. AVERAGE GLUCOSE BLD GHB EST-MCNC: 120 MG/DL
HBA1C MFR BLD: 5.8 %
MICROALBUMIN UR-MCNC: <7 MG/L
MICROALBUMIN/CREAT UR: NORMAL MG/G{CREAT}

## 2025-01-17 DIAGNOSIS — E03.9 ACQUIRED HYPOTHYROIDISM: ICD-10-CM

## 2025-01-17 RX ORDER — LEVOTHYROXINE SODIUM 75 UG/1
75 TABLET ORAL DAILY
Qty: 90 TABLET | Refills: 3 | Status: SHIPPED | OUTPATIENT
Start: 2025-01-17

## 2025-02-17 ENCOUNTER — OFFICE VISIT (OUTPATIENT)
Dept: PRIMARY CARE | Facility: CLINIC | Age: 80
End: 2025-02-17
Payer: MEDICARE

## 2025-02-17 VITALS
DIASTOLIC BLOOD PRESSURE: 80 MMHG | BODY MASS INDEX: 35.4 KG/M2 | SYSTOLIC BLOOD PRESSURE: 136 MMHG | WEIGHT: 253.8 LBS | HEART RATE: 68 BPM

## 2025-02-17 DIAGNOSIS — T14.8XXA HEMATOMA: ICD-10-CM

## 2025-02-17 DIAGNOSIS — J43.2 CENTRILOBULAR EMPHYSEMA (MULTI): ICD-10-CM

## 2025-02-17 DIAGNOSIS — E11.9 TYPE 2 DIABETES MELLITUS WITHOUT COMPLICATION, WITHOUT LONG-TERM CURRENT USE OF INSULIN (MULTI): ICD-10-CM

## 2025-02-17 DIAGNOSIS — D49.2 ABNORMAL SKIN GROWTH: ICD-10-CM

## 2025-02-17 DIAGNOSIS — I25.10 CORONARY ARTERY DISEASE, UNSPECIFIED VESSEL OR LESION TYPE, UNSPECIFIED WHETHER ANGINA PRESENT, UNSPECIFIED WHETHER NATIVE OR TRANSPLANTED HEART: ICD-10-CM

## 2025-02-17 DIAGNOSIS — I10 PRIMARY HYPERTENSION: Primary | ICD-10-CM

## 2025-02-17 DIAGNOSIS — N40.0 BENIGN PROSTATIC HYPERPLASIA WITHOUT URINARY OBSTRUCTION: ICD-10-CM

## 2025-02-17 PROBLEM — J18.9 COMMUNITY ACQUIRED PNEUMONIA: Status: RESOLVED | Noted: 2023-12-11 | Resolved: 2025-02-17

## 2025-02-17 PROBLEM — M79.9 DISORDER OF SOFT TISSUE: Status: RESOLVED | Noted: 2023-12-11 | Resolved: 2025-02-17

## 2025-02-17 PROCEDURE — 1125F AMNT PAIN NOTED PAIN PRSNT: CPT | Performed by: FAMILY MEDICINE

## 2025-02-17 PROCEDURE — 99214 OFFICE O/P EST MOD 30 MIN: CPT | Performed by: FAMILY MEDICINE

## 2025-02-17 PROCEDURE — 1157F ADVNC CARE PLAN IN RCRD: CPT | Performed by: FAMILY MEDICINE

## 2025-02-17 PROCEDURE — 3075F SYST BP GE 130 - 139MM HG: CPT | Performed by: FAMILY MEDICINE

## 2025-02-17 PROCEDURE — 1159F MED LIST DOCD IN RCRD: CPT | Performed by: FAMILY MEDICINE

## 2025-02-17 PROCEDURE — 1036F TOBACCO NON-USER: CPT | Performed by: FAMILY MEDICINE

## 2025-02-17 PROCEDURE — 3079F DIAST BP 80-89 MM HG: CPT | Performed by: FAMILY MEDICINE

## 2025-02-17 PROCEDURE — 1124F ACP DISCUSS-NO DSCNMKR DOCD: CPT | Performed by: FAMILY MEDICINE

## 2025-02-17 RX ORDER — LISINOPRIL 10 MG/1
10 TABLET ORAL DAILY
Qty: 90 TABLET | Refills: 1 | Status: SHIPPED | OUTPATIENT
Start: 2025-02-17

## 2025-02-17 RX ORDER — PREDNISOLONE ACETATE 10 MG/ML
1 SUSPENSION/ DROPS OPHTHALMIC 4 TIMES DAILY
COMMUNITY
Start: 2025-01-16

## 2025-02-17 ASSESSMENT — ENCOUNTER SYMPTOMS
LOSS OF SENSATION IN FEET: 0
DIZZINESS: 0
PALPITATIONS: 0
ACTIVITY CHANGE: 0
DEPRESSION: 1
COUGH: 0
OCCASIONAL FEELINGS OF UNSTEADINESS: 1
SHORTNESS OF BREATH: 0
CHEST TIGHTNESS: 0
HEADACHES: 0

## 2025-02-17 ASSESSMENT — PAIN SCALES - GENERAL: PAINLEVEL_OUTOF10: 3

## 2025-02-17 NOTE — PATIENT INSTRUCTIONS
Continue your current meds.   Use heat on the hematoma on your leg twice a day.   The lesion on your face should crust and fall off over the next week. Let me know if that does not resolve it.    stage I stage II

## 2025-02-17 NOTE — PROGRESS NOTES
He presents today Subjective   Patient ID: Fortino Hastings is a 79 y.o. male who presents for Skin Tag, Discuss Shingles vaccine, Hypertension, and Diabetes.    He presents today for his regular follow-up.  Overall doing well.  He did see his cardiologist recently and they redid the catheterization and he states that his vessel narrowings were actually improved.  I reviewed his lab tests from January and the BMP, TSH and hemoglobin A1c all look great.  Overall he is feeling well.  He did fall the other day.  He states he has that 1 step in his house and was carrying something and caught his foot on the step and then came forward.  He injured his right lower leg.  He stated was quite swollen that day but then improved.  He did not seek any treatment for this since the next day he was already feeling better.  He continues to feel better today is much better.  There is been some ecchymosis around the area.         Review of Systems   Constitutional:  Negative for activity change.   Respiratory:  Negative for cough, chest tightness and shortness of breath.    Cardiovascular:  Negative for chest pain, palpitations and leg swelling.   Skin:         He has what he calls a skin tag just to the lateral side of the left nose.  He states he was playing with a little bit at came off partially but still is there.   Neurological:  Negative for dizziness and headaches.       Objective   /80   Pulse 68   Wt 115 kg (253 lb 12.8 oz)   BMI 35.40 kg/m²     Physical Exam  Constitutional:       Appearance: Normal appearance.   Neck:      Thyroid: No thyromegaly or thyroid tenderness.      Vascular: No carotid bruit.   Cardiovascular:      Rate and Rhythm: Normal rate and regular rhythm.      Heart sounds: No murmur heard.  Pulmonary:      Effort: Pulmonary effort is normal.      Breath sounds: Normal breath sounds.   Musculoskeletal:      Cervical back: Neck supple.      Comments: In the medial lower leg just distal to the knee  he has a hematoma that is somewhat tender to palpate.  Inferior and medial to this is ecchymosis of the skin that is nontender.   Skin:     Findings: Lesion (crusted, flat, white lesion just lateral to the left side of nose 1 mm) present.   Neurological:      Mental Status: He is alert.   Psychiatric:         Mood and Affect: Mood normal.         Assessment/Plan   Diagnoses and all orders for this visit:  Primary hypertension  Coronary artery disease, unspecified vessel or lesion type, unspecified whether angina present, unspecified whether native or transplanted heart  -     lisinopril 10 mg tablet; Take 1 tablet (10 mg) by mouth once daily.  Type 2 diabetes mellitus without complication, without long-term current use of insulin (Multi)  Benign prostatic hyperplasia without urinary obstruction  Centrilobular emphysema (Multi)  Hematoma  He will continue his current medications.  I recommend that he use the heat the hematoma twice a day to help bring blood to the area to resolve this.  Let me know if that lesion on his face does not resolve in the next week or 2.

## 2025-02-20 ENCOUNTER — APPOINTMENT (OUTPATIENT)
Dept: PULMONOLOGY | Facility: CLINIC | Age: 80
End: 2025-02-20
Payer: MEDICARE

## 2025-02-20 VITALS
WEIGHT: 255.6 LBS | BODY MASS INDEX: 35.65 KG/M2 | OXYGEN SATURATION: 93 % | DIASTOLIC BLOOD PRESSURE: 76 MMHG | HEART RATE: 74 BPM | SYSTOLIC BLOOD PRESSURE: 164 MMHG

## 2025-02-20 DIAGNOSIS — J43.2 CENTRILOBULAR EMPHYSEMA (MULTI): Primary | ICD-10-CM

## 2025-02-20 PROCEDURE — 1160F RVW MEDS BY RX/DR IN RCRD: CPT | Performed by: PEDIATRICS

## 2025-02-20 PROCEDURE — 3077F SYST BP >= 140 MM HG: CPT | Performed by: PEDIATRICS

## 2025-02-20 PROCEDURE — 99215 OFFICE O/P EST HI 40 MIN: CPT | Performed by: PEDIATRICS

## 2025-02-20 PROCEDURE — 1159F MED LIST DOCD IN RCRD: CPT | Performed by: PEDIATRICS

## 2025-02-20 PROCEDURE — 3078F DIAST BP <80 MM HG: CPT | Performed by: PEDIATRICS

## 2025-02-20 PROCEDURE — 1036F TOBACCO NON-USER: CPT | Performed by: PEDIATRICS

## 2025-02-20 PROCEDURE — 1157F ADVNC CARE PLAN IN RCRD: CPT | Performed by: PEDIATRICS

## 2025-02-20 NOTE — PROGRESS NOTES
Subjective   Patient ID: Fortino Hastings is a 79 y.o. male who presents for COPD/emphysema, lung nodules    HPI    12/15/2022:  New patient here today for follow-up after hospitalization. He was hospitalized with pneumonia. On a chest CT he had quite a bit of middle lobe collapse. He also has emphysema. Patient does have albuterol at home he does get short of breath. I would like him to have a follow-up CT in 3 months at that time if the middle lobe area is still present then he may need a PET scan and EBUS.     03/16/23 he is here today for follow-up. He had a new chest CT which shows considerable improvement in the right middle lobe collapse. It is almost completely resolved but I would like to do another CT in 6 months. He had a PFT which shows an FEV1 of 74%. He was started on Stiolto which he really does like and he uses his albuterol in the evenings before bed which controls his wheezing. He had a lot of questions today with his wife I was able to answer these. 10 minutes spent preparing patient's chart. 30 minutes spent with patient answering questions and determining care. 10 minutes spent charting and ordering tests and medications     09/21/23 he is here today for follow-up. He had a new chest CT which does show improvement of the right middle lobe. He does have some very small nodules 4 and 5 mm. He is doing well with the Stiolto he uses his albuterol once a day. He tries to remain active. 5 minutes spent preparing patient's chart. 20 minutes spent with patient answering questions and determining care. 5 minutes spent charting and ordering tests and medications     03/28/24 he is here today for follow-up.  Unfortunately insurance would no longer cover his Stiolto so we had to change him to Anoro.  It is expensive for him.  It looks like insurance will cover Bevespi also I encouraged him to call insurance and see what his cost would be for the Bevespi.  Overall he has been doing well.  He just had carpal  tunnel surgery and is healing nicely.      08/28/24 2/20/2025:  Mr Hastings is doing well.  He was feeling terrible and finally realized that started when he changed to bevespi.  He stopped that and we switched him back to anoro and he has been doing great ever since.         Previous pulmonary history:   He has no history of recurrent infections, or lung disease as a child. He had no previous lung hx, never on oxygen or inhaler therapy. He currently is on no supplemental oxygen.      Inhalers/nebulized medications: albuterol      Hospitalization History:  He has been hospitalized over the last year for breathing related problem.     Sleep history:  Denies snoring, apneas. Admits to feeling tired during the day.   Does take frequent naps.  STOP-BANG score of 4     Comorbidities:   HTN  Hypothyroidism     SH:  smoking: former   drinking: none  illicit drug use: none     Occupation: (Full questionnaire on exposures obtained, discussed with the patient and scanned to EMR)  worked as retired teacher   No known exposure to asbestos, silica or beryllium     Family History:  No family history of lung diseases or cancer     Imaging history: (I have personally reviewed the imaging below)  9/13/2023:  stable nodules, slight decrease in RML collapse  12/1/22 Ct for PE consolidation in the middle lobe marked patchy and nodular infiltration in the middle lobe as well as the medial upper lobe. Several areas of large bulla. Also some consolidation in the left lung base. Prominent paratracheal nodes and enlarged hilar nodes     PFTs:   3/8/2023:  VC 64%, FEV1 70%, FEF 25-75 89%, TLC 63%, DLCO 66%  6 MWTs: None on record    Review of Systems    See scanned documents attached to this note for review of systems, and appropriate scales/scores for this visit.    Objective   Physical Exam  Constitutional:       Appearance: Normal appearance.   HENT:      Head: Normocephalic and atraumatic.      Mouth/Throat:      Pharynx: Oropharynx is  clear.   Cardiovascular:      Rate and Rhythm: Normal rate and regular rhythm.      Pulses: Normal pulses.      Heart sounds: Normal heart sounds.   Pulmonary:      Effort: Pulmonary effort is normal.      Breath sounds: Normal breath sounds. No wheezing, rhonchi or rales.   Abdominal:      General: Bowel sounds are normal.      Palpations: Abdomen is soft.   Musculoskeletal:         General: Normal range of motion.   Skin:     General: Skin is warm and dry.   Neurological:      General: No focal deficit present.      Mental Status: He is alert and oriented to person, place, and time.   Psychiatric:         Mood and Affect: Mood normal.       Assessment/Plan     # COPD with emphysema: - chest CT shows emphysema with bulla   - 03/16/23 he is using Stiolto and has noticed an improvement. He uses his albuterol every night before bed  09/21/23 he is doing very well with the Stiolto he uses that regularly uses his albuterol about once a day  03/28/24 insurance changed him to Anoro but it is very expensive.  Insurance will also cover Bevespi he is going to call and find out what his cost would be for Bevespi if it is less we can certainly change him  08/28/24 he is using Bevespi with good response, needs refills today. Uses albuterol as needed.   2/20/2025:  continue anoro and albuterol as needed       #Lung nodules   - With right middle lobe collapse  09/21/23 chest CT does show improvement of the right middle lobe also has some small nodules  2/20/2025: no further imaging needed unless symptoms worsen    Follow up 6 months               Sanford Ceron MD 02/20/25 9:59 AM

## 2025-03-31 ENCOUNTER — OFFICE VISIT (OUTPATIENT)
Dept: PRIMARY CARE | Facility: CLINIC | Age: 80
End: 2025-03-31
Payer: MEDICARE

## 2025-03-31 ENCOUNTER — TELEPHONE (OUTPATIENT)
Dept: PRIMARY CARE | Facility: CLINIC | Age: 80
End: 2025-03-31
Payer: MEDICARE

## 2025-03-31 VITALS
HEART RATE: 71 BPM | WEIGHT: 256.4 LBS | HEIGHT: 71 IN | SYSTOLIC BLOOD PRESSURE: 128 MMHG | BODY MASS INDEX: 35.9 KG/M2 | DIASTOLIC BLOOD PRESSURE: 60 MMHG

## 2025-03-31 DIAGNOSIS — T14.8XXA HEMATOMA: Primary | ICD-10-CM

## 2025-03-31 PROCEDURE — 3078F DIAST BP <80 MM HG: CPT | Performed by: FAMILY MEDICINE

## 2025-03-31 PROCEDURE — 1126F AMNT PAIN NOTED NONE PRSNT: CPT | Performed by: FAMILY MEDICINE

## 2025-03-31 PROCEDURE — 99213 OFFICE O/P EST LOW 20 MIN: CPT | Performed by: FAMILY MEDICINE

## 2025-03-31 PROCEDURE — 3074F SYST BP LT 130 MM HG: CPT | Performed by: FAMILY MEDICINE

## 2025-03-31 PROCEDURE — 1157F ADVNC CARE PLAN IN RCRD: CPT | Performed by: FAMILY MEDICINE

## 2025-03-31 PROCEDURE — 1036F TOBACCO NON-USER: CPT | Performed by: FAMILY MEDICINE

## 2025-03-31 PROCEDURE — 1159F MED LIST DOCD IN RCRD: CPT | Performed by: FAMILY MEDICINE

## 2025-03-31 ASSESSMENT — ENCOUNTER SYMPTOMS
LOSS OF SENSATION IN FEET: 0
OCCASIONAL FEELINGS OF UNSTEADINESS: 1
DEPRESSION: 0

## 2025-03-31 ASSESSMENT — PAIN SCALES - GENERAL: PAINLEVEL_OUTOF10: 0-NO PAIN

## 2025-03-31 NOTE — TELEPHONE ENCOUNTER
He still has the hematoma on the right leg just below his knee.  He said that that it sometimes feels warm and is hard.  He said it is not painful. He said it has shrunk in size, but he is just concerned that it has not gone away completely.  He is asking if he needs to come in and have you check it?

## 2025-03-31 NOTE — PROGRESS NOTES
"Subjective   Patient ID: Fortino Hastings is a 79 y.o. male who presents for Leg Pain.    HPI   He presents with persistence of a hematoma on his medial right lower leg.  He states he been using heat on it.  No longer has any bruising but it is just there.  Does not really bother him at all, no pain.  Just concerned it is not going away.    Review of Systems    Objective   /60   Pulse 71   Ht 1.803 m (5' 11\")   Wt 116 kg (256 lb 6.4 oz)   BMI 35.76 kg/m²     Physical Exam  He is alert, no parishes, his affect is pleasant.  He has round cystic area over the proximal tibia.  Nontender.  About the size of a tennis ball.    Assessment/Plan   Diagnoses and all orders for this visit:  Hematoma  -     Referral to Physical Therapy; Future  He can see physical therapy to see if they can do some ultrasound and get this resolved a bit.  I reassured him that it is benign at this point.       "

## 2025-04-07 ENCOUNTER — EVALUATION (OUTPATIENT)
Dept: PHYSICAL THERAPY | Facility: CLINIC | Age: 80
End: 2025-04-07
Payer: MEDICARE

## 2025-04-07 DIAGNOSIS — T14.8XXA HEMATOMA: Primary | ICD-10-CM

## 2025-04-07 DIAGNOSIS — R53.1 WEAKNESS: ICD-10-CM

## 2025-04-07 PROCEDURE — 97161 PT EVAL LOW COMPLEX 20 MIN: CPT | Mod: GP | Performed by: PHYSICAL THERAPIST

## 2025-04-07 NOTE — PROGRESS NOTES
"Physical Therapy Evaluation    Patient Name: Fortino Hastings  MRN: [id number]  Evaluation Date: 2025  Time Calculation  Start Time: 1128  Stop Time: 1152  Time Calculation (min): 24 min    Current Problem  Problem List Items Addressed This Visit             ICD-10-CM    Hematoma - Primary T14.8XXA    Relevant Orders    Follow Up In Physical Therapy    Weakness R53.1    Relevant Orders    Follow Up In Physical Therapy     D - AETNA MEDICARE - NO AUTH / 96% coins / $1500 OOP not met / [place]IS[place]    Subjective   General:       Patient reported hx of injury: Patient fell within the last several months onto his right knee, resulting in significant bruising and a large hematoma.  Since then his hematoma has improved, however this improvement has plateaued and he remains with a significant raised area on his right knee.  Of note, patient offers that he gets his carotids checked every 6 mo, and is on blood thinner, following his heart attack in .  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  Patient does  do some leg stretches and strengthening every morning, which she feels has helped his chronic low back pain.    Red Flags:  denies  Precautions:   On blood thinner.  COPD  Pain:   No significant pain right now, however he does suffer with chronic hip and back pain, as well as hip spasms  Home Living:     Pt gets around home safely generally, but did fall on step inside of home   Home type: House  Stairs: Yes    Work:  retired    Prior Function Per Pt/Caregiver Report:   Patient did not have a hematoma in his right knee  Pt goals: Eliminate hematoma on right knee, get a little more steadier with gait    OBJECTIVE:  If left blank, assume NT:    TU\"    Range of Motion:       Range of motion of bilateral knees WFL      Strength:     LE MMT R L   Hip flexion 4-/5 4/5   Hip extension     Hip abduction  3+/5 2/5   Hip adduction     Knee flexion     Knee " "extension 4/5 4+/5   Ankle DF 4+/5 4+/5   Ankle PF     Ankle eversion     Ankle inversion         Special Tests:   R knee hematoma is 6.5cm x 7cm LxW  Gait:   With cane, significant trendelenburg from L>R hip weakness, R > L toe out, crosses legs over every step  Balance:  Romberg stand with eyes open and eyes closed greater than 30 seconds each  Bed Mobility:   I  Transfers:   I    Outcome Measures:    WOMAC5 62/96    OP EDUCATION:   On PT plan of care    Assessment       pt is s/p fall with lingering right knee hematoma, as well as hip weakness and unsteady gait and needs PT to reduce hematoma size, to incr  strength, improve balance to restore function.      Based on the history including personal factors and/or comorbidities, examination of body systems including body structures and function, activity limitations, and/or participation restrictions, as well as clinical presentation, patient meets criteria for a low complexity evaluation.    Plan     Next session begin hip strengthening, commence ultrasound and heat to right knee hematoma    Skilled PT consisting of:  therapeutic exercise, therapeutic activity, NMR, manual, thermal, electric stimulation, US, light therapy, gait training.  Rehab Potential: good  Frequency:  2x/wk  Duration:  10 weeks  4/7/2025 to 7/4/2025 Medicare cert date:      Goals:    STG:   Pt to be I in initial HEP.    LTG\"s:  Incr MMT of bilateral hip abduction by 1 grade for improved gait stability.  Improve score on outcome form by 10 points to show incr in function.  Reduce hematoma length times with by 50%.    Some of This note was created using voice recognition software and was not corrected for typographical or grammatical errors.   "

## 2025-04-09 ENCOUNTER — TREATMENT (OUTPATIENT)
Dept: PHYSICAL THERAPY | Facility: CLINIC | Age: 80
End: 2025-04-09
Payer: MEDICARE

## 2025-04-09 DIAGNOSIS — T14.8XXA HEMATOMA: ICD-10-CM

## 2025-04-09 DIAGNOSIS — R53.1 WEAKNESS: ICD-10-CM

## 2025-04-09 PROCEDURE — 97110 THERAPEUTIC EXERCISES: CPT | Mod: GP,CQ

## 2025-04-14 ENCOUNTER — TREATMENT (OUTPATIENT)
Dept: PHYSICAL THERAPY | Facility: CLINIC | Age: 80
End: 2025-04-14
Payer: MEDICARE

## 2025-04-14 DIAGNOSIS — T14.8XXA HEMATOMA: ICD-10-CM

## 2025-04-14 DIAGNOSIS — R53.1 WEAKNESS: ICD-10-CM

## 2025-04-14 PROCEDURE — 97026 INFRARED THERAPY: CPT | Mod: GP,CQ

## 2025-04-14 PROCEDURE — 97110 THERAPEUTIC EXERCISES: CPT | Mod: GP,CQ

## 2025-04-14 NOTE — PROGRESS NOTES
"Physical Therapy Treatment    Patient Name: Fortino Hastings  MRN: [id number]  Evaluation Date: 2025  Time Calculation  Start Time: 1140  Stop Time: 1210  Time Calculation (min): 30 min   Current Problem  Problem List Items Addressed This Visit             ICD-10-CM    Hematoma - Primary T14.8XXA    Relevant Orders    Follow Up In Physical Therapy    Weakness R53.1    Relevant Orders    Follow Up In Physical Therapy       D - AETNA MEDICARE - NO AUTH / 96% coins / $1500 OOP not met / [place] VIS[place]    Subjective : feels hematoma is getting smaller  R knee area, no c/o discomfort, did not wish to pedal bike   General:       25:  Patient reported hx of injury: Patient fell within the last several months onto his right knee, resulting in significant bruising and a large hematoma.  Since then his hematoma has improved, however this improvement has plateaued and he remains with a significant raised area on his right knee.  Of note, patient offers that he gets his carotids checked every 6 mo, and is on blood thinner, following his heart attack in .  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  Patient does  do some leg stretches and strengthening every morning, which she feels has helped his chronic low back pain.    Red Flags:  denies  Precautions:   On blood thinner.  COPD  Pain:   No significant pain right now, however he does suffer with chronic hip and back pain, as well as hip spasms  Home Living:     Pt gets around home safely generally, but did fall on step inside of home   Home type: House  Stairs: Yes    Prior Function Per Pt/Caregiver Report:   Patient did not have a hematoma in his right knee  Pt goals: Eliminate hematoma on right knee, get a little more steadier with gait    OBJECTIVE:  If left blank, assume NT:    TU\"      TREATMENT:     Sitting :  LAQ 10 x 1   abduction using GTB    Ball squeeze 10 x " "1    Standing:  Add-abd  Marching  Toe raises  Side stepping  Walking forward/back    Supine LAQ 10x1    Ultrasound applied to R knee   Parameters:  2 MHz  Percentage:  50%  Patient Position:  Supine  Intensity:  1.0 Almeida/cm2, 5'  Comment:  skin check good  Applied  infra red light pads to knee 10 min with HP      Outcome Measures:    WOMAC5 62/96    OP EDUCATION:   On PT plan of care    Assessment: Christine session well cont to progress  with strengthening as christine.       4-7-25:  pt is s/p fall with lingering right knee hematoma, as well as hip weakness and unsteady gait and needs PT to reduce hematoma size, to incr  strength, improve balance to restore function.      Based on the history including personal factors and/or comorbidities, examination of body systems including body structures and function, activity limitations, and/or participation restrictions, as well as clinical presentation, patient meets criteria for a low complexity evaluation.    Plan     Next session begin hip strengthening, commence ultrasound and heat to right knee hematoma    Skilled PT consisting of:  therapeutic exercise, therapeutic activity, NMR, manual, thermal, electric stimulation, US, light therapy, gait training.  Rehab Potential: good  Frequency:  2x/wk  Duration:  10 weeks  4/7/2025 to 7/4/2025 Medicare cert date:      Goals:    STG:   Pt to be I in initial HEP.    LTG\"s:  Incr MMT of bilateral hip abduction by 1 grade for improved gait stability.  Improve score on outcome form by 10 points to show incr in function.  Reduce hematoma length times with by 50%.    Some of This note was created using voice recognition software and was not corrected for typographical or grammatical errors.   "

## 2025-04-23 ENCOUNTER — TREATMENT (OUTPATIENT)
Dept: PHYSICAL THERAPY | Facility: CLINIC | Age: 80
End: 2025-04-23
Payer: MEDICARE

## 2025-04-23 DIAGNOSIS — R53.1 WEAKNESS: ICD-10-CM

## 2025-04-23 DIAGNOSIS — T14.8XXA HEMATOMA: Primary | ICD-10-CM

## 2025-04-23 PROCEDURE — 97110 THERAPEUTIC EXERCISES: CPT | Mod: GP | Performed by: PHYSICAL THERAPIST

## 2025-04-23 NOTE — PROGRESS NOTES
Physical Therapy Treatment    Patient Name: Fortino Hastings  MRN: [id number]  Encounter date:  4/23/2025  Time Calculation  Start Time: 1510  Stop Time: 1550  Time Calculation (min): 40 min  PT Modalities Time Entry  Ultrasound Time Entry: 6  PT Therapeutic Procedures Time Entry  Therapeutic Exercise Time Entry: 22    Visit Number:  4 (including evaluation)  Planned total visits: 10  Visit Authorized:  [place]  D - AETNA MEDICARE - NO AUTH / 96% coins / $1500 OOP not met / [place] VIS[place]     Current Problem  Problem List Items Addressed This Visit           ICD-10-CM    Hematoma - Primary T14.8XXA    Weakness R53.1       D - AETNA MEDICARE - NO AUTH / 96% coins / $1500 OOP not met / [place] VISITS     General:     4-7-25:  Patient reported hx of injury: Patient fell within the last several months onto his right knee, resulting in significant bruising and a large hematoma.  Since then his hematoma has improved, however this improvement has plateaued and he remains with a significant raised area on his right knee.  Of note, patient offers that he gets his carotids checked every 6 mo, and is on blood thinner, following his heart attack in 2012.  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  Patient does  do some leg stretches and strengthening every morning, which she feels has helped his chronic low back pain.     Red Flags:  denies  Precautions:   On blood thinner.  COPD  Pain   0  Location  description  Subjective:        Good progress, hematoma is improving    Objective  R knee hematoma is 5.8 cm x 6 cm LxW, as well as less prominent    Treatment:  TREATMENT:     Sitting :  LAQ 10 x 2  Hip flexion x 10  abduction using GTB   Ball squeeze 10 x 1  AP x 10  Towel slides 2 x 10  HR/TR X 20    Standing all x 10:  Add-abd  Marching  Toe raises    Side stepping 4 laps bars  Walking forward/back 4 laps bars     Supine LAQ 10x1 dnp     Ultrasound applied to R knee  "  Parameters:  2 MHz  Percentage:  50%  Patient Position:  Supine  Intensity:  1.0 Almeida/cm2, 6'  Comment:  skin check good  Applied  infra red light pads to L knee 10 min -no charge  Heat not available today    OP EDUCATION:   No new ed    Assessment:     Pt's response to treatment:  deminishing hematoma with tx  Areas of improvements:  hematoma size  Limitations/deficits:  hematoma, weakness    Pain end of session:  0    Plan:     Continued PT required to reach all goals and restore function  Plan     Next session begin hip strengthening, incr ultrasound to 8', and heat , red/infr red light to right knee hematoma     Skilled PT consisting of:  therapeutic exercise, therapeutic activity, NMR, manual, thermal, electric stimulation, US, light therapy, gait training.  Rehab Potential: good  Frequency:  2x/wk  Duration:  10 weeks  4/7/2025 to 7/4/2025 Medicare cert date:       Goals:     STG:   Pt to be I in initial HEP.     LTG\"s:  Incr MMT of bilateral hip abduction by 1 grade for improved gait stability.  Improve score on outcome form by 10 points to show incr in function.  Reduce hematoma length times with by 50%.    Some of This note was created using voice recognition software and was not corrected for typographical or grammatical errors.        "

## 2025-04-28 ENCOUNTER — TELEPHONE (OUTPATIENT)
Dept: PHYSICAL THERAPY | Facility: CLINIC | Age: 80
End: 2025-04-28
Payer: MEDICARE

## 2025-04-30 ENCOUNTER — TREATMENT (OUTPATIENT)
Dept: PHYSICAL THERAPY | Facility: CLINIC | Age: 80
End: 2025-04-30
Payer: MEDICARE

## 2025-04-30 DIAGNOSIS — R53.1 WEAKNESS: ICD-10-CM

## 2025-04-30 DIAGNOSIS — T14.8XXA HEMATOMA: Primary | ICD-10-CM

## 2025-04-30 PROCEDURE — 97110 THERAPEUTIC EXERCISES: CPT | Mod: GP,CQ

## 2025-04-30 PROCEDURE — 97035 APP MDLTY 1+ULTRASOUND EA 15: CPT | Mod: GP,CQ

## 2025-04-30 NOTE — PROGRESS NOTES
Physical Therapy Treatment    Patient Name: Fortino Hastings  MRN: [id number]  Encounter date:  4/30/2025  Time Calculation  Start Time: 0410  Stop Time: 0440  Time Calculation (min): 30 min     PT Therapeutic Procedures Time Entry  Therapeutic Exercise Time Entry: 30    Visit Number:  5 (including evaluation)  Planned total visits: 10  Visit Authorized:  [place]  D - AETNA MEDICARE - NO AUTH / 96% coins / $1500 OOP not met / [place] VISITS     Current Problem       ICD-10-CM    Hematoma - Primary T14.8XXA    Weakness R53.1         D - AETNA MEDICARE - NO AUTH / 96% coins / $1500 OOP not met / [place] VISITS     General:     4-7-25:  Patient reported hx of injury: Patient fell within the last several months onto his right knee, resulting in significant bruising and a large hematoma.  Since then his hematoma has improved, however this improvement has plateaued and he remains with a significant raised area on his right knee.  Of note, patient offers that he gets his carotids checked every 6 mo, and is on blood thinner, following his heart attack in 2012.  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  Patient does  do some leg stretches and strengthening every morning, which she feels has helped his chronic low back pain.     Red Flags:  denies  Precautions:   On blood thinner.  COPD  Pain   0  Location  description  Subjective:        Good progress, hematoma is improving    Objective:     Prior:  R knee hematoma is 5.8 cm x 6 cm LxW, as well as less prominent    Treatment:  TREATMENT:   Thera ex 22 min  Sitting :  LAQ 10 x 2 applied # 2 wt   Hip flexion x 10 with # 2 wt  abduction using GTB   Ball squeeze 10 x 1  AP x 10  Towel slides 2 x 10  HR/TR X 20    Standing all x 10:  Add-abd  Marching  Toe raises    Side stepping 4 laps bars  Walking forward/back 4 laps bars     Supine LAQ 10x1 dnp     Ultrasound applied to R knee x 8 min  Parameters:  2 MHz  Percentage:   "50%  Patient Position:  Supine  Intensity:  1.0 Almeida/cm2, 6'- incr ultrasound to 8' next session  Comment:  skin check good    Applied  infra red light pads to L knee 10 min -no charge      OP EDUCATION:   No new ed    Assessment:     Pt's response to treatment:  deminishing hematoma with tx  Areas of improvements:  hematoma size  Limitations/deficits:  hematoma, weakness    Pain end of session:  0    Plan:     Continued PT required to reach all goals and restore function  Plan     Next session begin hip strengthening, incr ultrasound to 8', and heat , red/infr red light to right knee hematoma     Skilled PT consisting of:  therapeutic exercise, therapeutic activity, NMR, manual, thermal, electric stimulation, US, light therapy, gait training.  Rehab Potential: good  Frequency:  2x/wk  Duration:  10 weeks  4/7/2025 to 7/4/2025 Medicare cert date:       Goals:     STG:   Pt to be I in initial HEP.     LTG\"s:  Incr MMT of bilateral hip abduction by 1 grade for improved gait stability.  Improve score on outcome form by 10 points to show incr in function.  Reduce hematoma length times with by 50%.    Some of This note was created using voice recognition software and was not corrected for typographical or grammatical errors.        "

## 2025-05-06 ENCOUNTER — TREATMENT (OUTPATIENT)
Dept: PHYSICAL THERAPY | Facility: CLINIC | Age: 80
End: 2025-05-06
Payer: MEDICARE

## 2025-05-06 DIAGNOSIS — T14.8XXA HEMATOMA: ICD-10-CM

## 2025-05-06 DIAGNOSIS — R53.1 WEAKNESS: ICD-10-CM

## 2025-05-06 PROCEDURE — 97035 APP MDLTY 1+ULTRASOUND EA 15: CPT | Mod: GP,CQ

## 2025-05-06 PROCEDURE — 97110 THERAPEUTIC EXERCISES: CPT | Mod: GP,CQ

## 2025-05-06 NOTE — PROGRESS NOTES
Physical Therapy Treatment    Patient Name: Fortino Hastings  MRN: [id number]  Encounter date:  5/6/2025  Time Calculation  Start Time: 1140  Stop Time: 1210  Time Calculation (min): 30 min  PT Modalities Time Entry  Infrared Time Entry: 10  Ultrasound Time Entry: 10  PT Therapeutic Procedures Time Entry  Therapeutic Exercise Time Entry: 10    Visit Number:  7 (including evaluation)  Planned total visits: 10  Visit Authorized:  [place]  D - [employer] MED[employer]ARE - NO AUTH / 96% coins / $1500 OOP not met / [place] VISITS     Current Problem       ICD-10-CM    Hematoma - Primary T14.8XXA    Weakness R53.1         D - [employer] MED[employer]ARE - NO AUTH / 96% coins / $1500 OOP not met / [place] VISITS     General:     4-7-25:  Patient reported hx of injury: Patient fell within the last several months onto his right knee, resulting in significant bruising and a large hematoma.  Since then his hematoma has improved, however this improvement has plateaued and he remains with a significant raised area on his right knee.  Of note, patient offers that he gets his carotids checked every 6 mo, and is on blood thinner, following his heart attack in 2012.  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  Patient does  do some leg stretches and strengthening every morning, which she feels has helped his chronic low back pain.     Red Flags:  denies  Precautions:   On blood thinner.  COPD  Pain   0  Location  description  Subjective: feels hematoma size is decreasing , no c/o discomfort        Good progress, hematoma is improving    Objective:     Prior:  R knee hematoma is 5.8 cm x 6 cm LxW, as well as less prominent    Treatment:  TREATMENT:   Thera ex 22 min  Sitting :  LAQ 10 x 2 applied # 2 wt   Hip flexion x 10 with # 2 wt  abduction using GTB   Ball squeeze 10 x 1  AP x 10  Towel slides 2 x 10  HR/TR X 20    Standing all x 10: with # 2 wt added   Add-abd  Marching  Toe raises    Side stepping 4  "laps bars  Walking forward/back 4 laps bars     Supine SAQ 10x1 dnp     Ultrasound applied to R knee x 8 min  Parameters:  2 MHz  Percentage:  50%  Patient Position:  Supine  Intensity:  1.0 [place]/cm2, 6'- incr ultrasound to 8' next session  Comment:  skin check good    Applied  infra red light pads to L knee 10 min       OP EDUCATION:   No new ed    Assessment: size of hematoma decreasing cont to progress with strengthening act     Pt's response to treatment:  deminishing hematoma with tx  Areas of improvements:  hematoma size  Limitations/deficits:  hematoma, weakness    Pain end of session:  0    Plan:     Continued PT required to reach all goals and restore function  Plan     Next session begin hip strengthening, incr ultrasound to 10', and heat , red/infr red light to right knee hematoma     Skilled PT consisting of:  therapeutic exercise, therapeutic activity, NMR, manual, thermal, electric stimulation, US, light therapy, gait training.  Rehab Potential: good  Frequency:  2x/wk  Duration:  10 weeks  4/7/2025 to 7/4/2025 Medicare cert date:       Goals:     STG:   Pt to be I in initial HEP.     LTG\"s:  Incr MMT of bilateral hip abduction by 1 grade for improved gait stability.  Improve score on outcome form by 10 points to show incr in function.  Reduce hematoma length times with by 50%.    Some of This note was created using voice recognition software and was not corrected for typographical or grammatical errors.        "

## 2025-05-14 ENCOUNTER — DOCUMENTATION (OUTPATIENT)
Dept: PRIMARY CARE | Facility: CLINIC | Age: 80
End: 2025-05-14
Payer: MEDICARE

## 2025-05-22 ENCOUNTER — DOCUMENTATION (OUTPATIENT)
Dept: PRIMARY CARE | Facility: CLINIC | Age: 80
End: 2025-05-22
Payer: MEDICARE

## 2025-05-22 NOTE — PROGRESS NOTES
Patient was identified for CCM services due to program criteria. The nurse care manager attempted to reach patient with 2 or more unsuccessful attempts. Due to inability to reach patient for enrollment/ engagement services are being closed due to inability to reach patient.

## 2025-07-09 DIAGNOSIS — I25.10 CORONARY ARTERY DISEASE, UNSPECIFIED VESSEL OR LESION TYPE, UNSPECIFIED WHETHER ANGINA PRESENT, UNSPECIFIED WHETHER NATIVE OR TRANSPLANTED HEART: ICD-10-CM

## 2025-07-09 DIAGNOSIS — I10 PRIMARY HYPERTENSION: ICD-10-CM

## 2025-07-10 RX ORDER — ROSUVASTATIN CALCIUM 10 MG/1
TABLET, COATED ORAL
Qty: 90 TABLET | Refills: 1 | Status: SHIPPED | OUTPATIENT
Start: 2025-07-10

## 2025-07-10 RX ORDER — METOPROLOL TARTRATE 25 MG/1
25 TABLET, FILM COATED ORAL 2 TIMES DAILY
Qty: 180 TABLET | Refills: 1 | Status: SHIPPED | OUTPATIENT
Start: 2025-07-10

## 2025-07-10 RX ORDER — CLOPIDOGREL BISULFATE 75 MG/1
75 TABLET ORAL DAILY
Qty: 90 TABLET | Refills: 1 | Status: SHIPPED | OUTPATIENT
Start: 2025-07-10

## 2025-07-17 ENCOUNTER — DOCUMENTATION (OUTPATIENT)
Dept: PHYSICAL THERAPY | Facility: CLINIC | Age: 80
End: 2025-07-17
Payer: MEDICARE

## 2025-07-17 NOTE — PROGRESS NOTES
Physical Therapy    Discharge Summary    Name: Fortino Hastings  MRN: [id number]  : 1945  Date: 25    Discharge Summary: PT    Discharge Information: Date of discharge 25      Discharge Status: at last session, pt was making good progress     Rehab Discharge Reason: Failed to schedule and/or keep follow-up appointment(s)

## 2025-08-04 DIAGNOSIS — N40.0 BENIGN PROSTATIC HYPERPLASIA WITHOUT URINARY OBSTRUCTION: ICD-10-CM

## 2025-08-04 DIAGNOSIS — I25.10 CORONARY ARTERY DISEASE, UNSPECIFIED VESSEL OR LESION TYPE, UNSPECIFIED WHETHER ANGINA PRESENT, UNSPECIFIED WHETHER NATIVE OR TRANSPLANTED HEART: ICD-10-CM

## 2025-08-04 RX ORDER — TAMSULOSIN HYDROCHLORIDE 0.4 MG/1
CAPSULE ORAL
Qty: 90 CAPSULE | Refills: 1 | Status: SHIPPED | OUTPATIENT
Start: 2025-08-04

## 2025-08-04 RX ORDER — LISINOPRIL 10 MG/1
10 TABLET ORAL DAILY
Qty: 90 TABLET | Refills: 1 | Status: SHIPPED | OUTPATIENT
Start: 2025-08-04

## 2025-08-04 NOTE — TELEPHONE ENCOUNTER
Last OV:   3/31/25  Next OV:   none scheduled  Last Refill:   lisinopril 2/17/25  #90, 1 refill                       Tamsulosin 1/7/25  #90, 1 refill  Last Labs:   UTD

## 2025-08-20 ENCOUNTER — APPOINTMENT (OUTPATIENT)
Dept: PULMONOLOGY | Facility: CLINIC | Age: 80
End: 2025-08-20
Payer: MEDICARE

## (undated) DEVICE — ANGIO KIT, LEFT HEART, LF, CUSTOM

## (undated) DEVICE — CLOSURE DEVICE, VASCULAR, MYNX, 5FR

## (undated) DEVICE — CLOSURE DEVICE, VASCULAR, MYNX CONTROL, 5FR

## (undated) DEVICE — NEEDLE, MERIT ADVANCE, ONE WALL,  21GA X 7.0CM, STANDARD SMOOTH

## (undated) DEVICE — MANIFOLD KIT, CUSTOM, GEAUGA

## (undated) DEVICE — ACCESS SYSTEM, PINNACLE PRECISION, 5FR X 10CM, ECHOGENIC NEEDLE

## (undated) DEVICE — CATHETER, ANGIO, IMPULSE, FL4, 5 FR X 100 CM

## (undated) DEVICE — CATHETER, INFINITI DIAGNOSTIC, 5 FR 100CM 3DRC, WILLIAMS RIGHT OR NO TORQUE